# Patient Record
Sex: FEMALE | Race: WHITE | NOT HISPANIC OR LATINO | Employment: FULL TIME | URBAN - METROPOLITAN AREA
[De-identification: names, ages, dates, MRNs, and addresses within clinical notes are randomized per-mention and may not be internally consistent; named-entity substitution may affect disease eponyms.]

---

## 2017-12-05 ENCOUNTER — OFFICE VISIT (OUTPATIENT)
Dept: PRIMARY CARE CLINIC | Facility: CLINIC | Age: 32
End: 2017-12-05
Payer: MEDICAID

## 2017-12-05 VITALS
HEART RATE: 68 BPM | TEMPERATURE: 98 F | WEIGHT: 151.31 LBS | SYSTOLIC BLOOD PRESSURE: 111 MMHG | DIASTOLIC BLOOD PRESSURE: 72 MMHG | OXYGEN SATURATION: 99 % | RESPIRATION RATE: 18 BRPM | HEIGHT: 66 IN | BODY MASS INDEX: 24.32 KG/M2

## 2017-12-05 DIAGNOSIS — Z00.00 ANNUAL PHYSICAL EXAM: Primary | ICD-10-CM

## 2017-12-05 DIAGNOSIS — Z12.4 CERVICAL CANCER SCREENING: ICD-10-CM

## 2017-12-05 DIAGNOSIS — L70.9 ACNE, UNSPECIFIED ACNE TYPE: ICD-10-CM

## 2017-12-05 DIAGNOSIS — F17.210 CIGARETTE NICOTINE DEPENDENCE WITHOUT COMPLICATION: ICD-10-CM

## 2017-12-05 DIAGNOSIS — Z13.220 LIPID SCREENING: ICD-10-CM

## 2017-12-05 DIAGNOSIS — M25.551 RIGHT HIP PAIN: ICD-10-CM

## 2017-12-05 PROCEDURE — 99204 OFFICE O/P NEW MOD 45 MIN: CPT | Mod: PBBFAC,PN | Performed by: FAMILY MEDICINE

## 2017-12-05 PROCEDURE — 99385 PREV VISIT NEW AGE 18-39: CPT | Mod: S$PBB,,, | Performed by: FAMILY MEDICINE

## 2017-12-05 PROCEDURE — 99999 PR PBB SHADOW E&M-NEW PATIENT-LVL IV: CPT | Mod: PBBFAC,,, | Performed by: FAMILY MEDICINE

## 2017-12-05 NOTE — PROGRESS NOTES
"Subjective:       Patient ID: Blanca Flores is a 32 y.o. female.    Chief Complaint: Establish Care (pt is here to est care with a new PCP )    Here to get established with new PCP.  Needs routine checkup, requesting referral to GYN and dermatology.  No concerns or complaints other than right hip pain.  She reports that she has a long history of nonspecific lateral right hip swelling.  Has seen other physicians and a chiropractor in the past, was told there was nothing wrong with her hip or her back.  Last x-ray of her hip was approximately 1 year ago.  No known history of trauma or injury.  She does teach dancing for a living, usually 6 days a week.  The past several months, she has been having increasing pain in her right hip, worse with movement.  Occasionally takes over-the-counter NSAIDs with modest relief.      Review of Systems   Constitutional: Negative for chills, fatigue and fever.   HENT: Negative for congestion.    Eyes: Negative for visual disturbance.   Respiratory: Negative for cough and shortness of breath.    Cardiovascular: Negative for chest pain.   Gastrointestinal: Negative for abdominal pain, nausea and vomiting.   Genitourinary: Negative for difficulty urinating.   Musculoskeletal: Positive for arthralgias.   Skin: Negative for rash.   Allergic/Immunologic: Negative for immunocompromised state.   Neurological: Negative for dizziness.   Psychiatric/Behavioral: Negative for sleep disturbance.       Objective:      Vitals:    12/05/17 1002   BP: 111/72   BP Location: Right arm   Patient Position: Sitting   BP Method: Medium (Automatic)   Pulse: 68   Resp: 18   Temp: 98.1 °F (36.7 °C)   TempSrc: Oral   SpO2: 99%   Weight: 68.6 kg (151 lb 4.8 oz)   Height: 5' 6" (1.676 m)     Physical Exam   Constitutional: She is oriented to person, place, and time. She appears well-developed and well-nourished.   HENT:   Head: Normocephalic and atraumatic.   Eyes: EOM are normal. Pupils are equal, round, and " reactive to light.   Neck: Neck supple. No JVD present. Carotid bruit is not present.   Cardiovascular: Normal rate, regular rhythm and normal heart sounds.    Pulses:       Radial pulses are 2+ on the right side, and 2+ on the left side.   Pulmonary/Chest: Effort normal and breath sounds normal.   Abdominal: Soft. Bowel sounds are normal. She exhibits no distension. There is no tenderness.   Musculoskeletal: She exhibits no edema.        Right hip: She exhibits normal range of motion, normal strength, no tenderness, no bony tenderness, no swelling and no crepitus.        Thoracic back: She exhibits normal range of motion and no deformity.        Lumbar back: She exhibits normal range of motion and no deformity.   Mild soft tissue swelling noted to right hip laterally   Neurological: She is alert and oriented to person, place, and time.   Skin: Skin is warm and dry.   Psychiatric: She has a normal mood and affect. Her behavior is normal.   Nursing note and vitals reviewed.      Assessment:       1. Annual physical exam    2. Cigarette nicotine dependence without complication    3. Lipid screening    4. Right hip pain    5. Acne, unspecified acne type    6. Cervical cancer screening        Plan:       Annual physical exam  -     CBC auto differential; Future; Expected date: 12/05/2017  -     Comprehensive metabolic panel; Future; Expected date: 12/05/2017  -     Lipid panel; Future; Expected date: 12/05/2017  -     TSH; Future; Expected date: 12/05/2017    Cigarette nicotine dependence without complication  Comments:  encouraged to quit, pt precontemplative    Lipid screening  -     Lipid panel; Future; Expected date: 12/05/2017    Right hip pain  Comments:  Apply ice 3 times a day, NSAIDs as needed  Orders:  -     X-Ray Hip 2 or 3 views Right; Future; Expected date: 12/06/2017    Acne, unspecified acne type  -     Ambulatory referral to Dermatology    Cervical cancer screening  -     Ambulatory referral to  Obstetrics / Gynecology      Medication List with Changes/Refills   Discontinued Medications    IBUPROFEN (ADVIL,MOTRIN) 400 MG TABLET    Take 400 mg by mouth every 6 (six) hours as needed.

## 2017-12-06 ENCOUNTER — CLINICAL SUPPORT (OUTPATIENT)
Dept: PRIMARY CARE CLINIC | Facility: CLINIC | Age: 32
End: 2017-12-06
Payer: MEDICAID

## 2017-12-06 DIAGNOSIS — Z13.220 LIPID SCREENING: ICD-10-CM

## 2017-12-06 DIAGNOSIS — Z00.00 ANNUAL PHYSICAL EXAM: ICD-10-CM

## 2017-12-06 LAB
ALBUMIN SERPL BCP-MCNC: 3.6 G/DL
ALP SERPL-CCNC: 75 U/L
ALT SERPL W/O P-5'-P-CCNC: 11 U/L
ANION GAP SERPL CALC-SCNC: 5 MMOL/L
AST SERPL-CCNC: 14 U/L
BASOPHILS # BLD AUTO: 0.05 K/UL
BASOPHILS NFR BLD: 0.6 %
BILIRUB SERPL-MCNC: 0.4 MG/DL
BUN SERPL-MCNC: 9 MG/DL
CALCIUM SERPL-MCNC: 9.3 MG/DL
CHLORIDE SERPL-SCNC: 109 MMOL/L
CHOLEST SERPL-MCNC: 150 MG/DL
CHOLEST/HDLC SERPL: 4.7 {RATIO}
CO2 SERPL-SCNC: 28 MMOL/L
CREAT SERPL-MCNC: 0.7 MG/DL
DIFFERENTIAL METHOD: ABNORMAL
EOSINOPHIL # BLD AUTO: 0.5 K/UL
EOSINOPHIL NFR BLD: 5.9 %
ERYTHROCYTE [DISTWIDTH] IN BLOOD BY AUTOMATED COUNT: 13.2 %
EST. GFR  (AFRICAN AMERICAN): >60 ML/MIN/1.73 M^2
EST. GFR  (NON AFRICAN AMERICAN): >60 ML/MIN/1.73 M^2
GLUCOSE SERPL-MCNC: 98 MG/DL
HCT VFR BLD AUTO: 44.4 %
HDLC SERPL-MCNC: 32 MG/DL
HDLC SERPL: 21.3 %
HGB BLD-MCNC: 14.6 G/DL
IMM GRANULOCYTES # BLD AUTO: 0.03 K/UL
IMM GRANULOCYTES NFR BLD AUTO: 0.4 %
LDLC SERPL CALC-MCNC: 103.8 MG/DL
LYMPHOCYTES # BLD AUTO: 2.1 K/UL
LYMPHOCYTES NFR BLD: 25.3 %
MCH RBC QN AUTO: 31.4 PG
MCHC RBC AUTO-ENTMCNC: 32.9 G/DL
MCV RBC AUTO: 96 FL
MONOCYTES # BLD AUTO: 0.4 K/UL
MONOCYTES NFR BLD: 4.6 %
NEUTROPHILS # BLD AUTO: 5.3 K/UL
NEUTROPHILS NFR BLD: 63.2 %
NONHDLC SERPL-MCNC: 118 MG/DL
NRBC BLD-RTO: 0 /100 WBC
PLATELET # BLD AUTO: 234 K/UL
PMV BLD AUTO: 10.9 FL
POTASSIUM SERPL-SCNC: 4.9 MMOL/L
PROT SERPL-MCNC: 7.1 G/DL
RBC # BLD AUTO: 4.65 M/UL
SODIUM SERPL-SCNC: 142 MMOL/L
TRIGL SERPL-MCNC: 71 MG/DL
TSH SERPL DL<=0.005 MIU/L-ACNC: 1.06 UIU/ML
WBC # BLD AUTO: 8.31 K/UL

## 2017-12-06 PROCEDURE — 80053 COMPREHEN METABOLIC PANEL: CPT

## 2017-12-06 PROCEDURE — 84443 ASSAY THYROID STIM HORMONE: CPT

## 2017-12-06 PROCEDURE — 99212 OFFICE O/P EST SF 10 MIN: CPT | Mod: PBBFAC,PN

## 2017-12-06 PROCEDURE — 85025 COMPLETE CBC W/AUTO DIFF WBC: CPT

## 2017-12-06 PROCEDURE — 80061 LIPID PANEL: CPT

## 2017-12-06 PROCEDURE — 99999 PR PBB SHADOW E&M-EST. PATIENT-LVL II: CPT | Mod: PBBFAC,,,

## 2017-12-12 ENCOUNTER — OFFICE VISIT (OUTPATIENT)
Dept: OBSTETRICS AND GYNECOLOGY | Facility: CLINIC | Age: 32
End: 2017-12-12
Payer: MEDICAID

## 2017-12-12 VITALS
SYSTOLIC BLOOD PRESSURE: 100 MMHG | HEIGHT: 66 IN | WEIGHT: 151.25 LBS | BODY MASS INDEX: 24.31 KG/M2 | DIASTOLIC BLOOD PRESSURE: 60 MMHG

## 2017-12-12 DIAGNOSIS — Z30.09 ENCOUNTER FOR OTHER GENERAL COUNSELING OR ADVICE ON CONTRACEPTION: ICD-10-CM

## 2017-12-12 DIAGNOSIS — Z01.419 ENCOUNTER FOR GYNECOLOGICAL EXAMINATION (GENERAL) (ROUTINE) WITHOUT ABNORMAL FINDINGS: Primary | ICD-10-CM

## 2017-12-12 PROCEDURE — 99999 PR PBB SHADOW E&M-EST. PATIENT-LVL III: CPT | Mod: PBBFAC,,, | Performed by: OBSTETRICS & GYNECOLOGY

## 2017-12-12 PROCEDURE — 99385 PREV VISIT NEW AGE 18-39: CPT | Mod: S$PBB,,, | Performed by: OBSTETRICS & GYNECOLOGY

## 2017-12-12 PROCEDURE — 88175 CYTOPATH C/V AUTO FLUID REDO: CPT

## 2017-12-12 PROCEDURE — 87624 HPV HI-RISK TYP POOLED RSLT: CPT

## 2017-12-12 PROCEDURE — 99213 OFFICE O/P EST LOW 20 MIN: CPT | Mod: PBBFAC,PN | Performed by: OBSTETRICS & GYNECOLOGY

## 2017-12-12 NOTE — PROGRESS NOTES
Chief Complaint   Patient presents with    Well Woman    Contraception     want new iud       HPI:  32 y.o. female  presents as a new patient for a well woman exam    No LMP recorded. Patient is not currently having periods (Reason: Other).    - History of abnormal paps: History of LEEP with normal paps since  - Abnormal bleeding: DENIES  - Family history of breast or ovarian cancer: PATERNAL AUNT WITH BREAST CANCER, MATERNAL AUNT WITH CERVICAL CANCER  - Any breast masses, pain, skin changes, or nipple discharge: DENIES  - Possible recent STD exposure: DENIES    - Patient's Dermatologist recommended spironolactone for acne control  - Patient wishes to discuss this or alternative medications for acne control    Contraception: MIRENA IUD,   Pap: No result found, 2-3 YEARS AGO    Past Medical History:   Diagnosis Date    Anxiety     Cervical dysplasia     s/p LEEP    Depression     Ovarian cyst      Past Surgical History:   Procedure Laterality Date    APPENDECTOMY      BREAST SURGERY      CERVICAL BIOPSY  W/ LOOP ELECTRODE EXCISION      OVARY SURGERY      SHOULDER SURGERY         Social History   Substance Use Topics    Smoking status: Current Every Day Smoker     Packs/day: 1.50     Types: Cigarettes    Smokeless tobacco: Never Used    Alcohol use Yes      Comment: social      Family History   Problem Relation Age of Onset    Lupus      Arthritis Mother     Diabetes Mother     Rheum arthritis Mother     Hyperlipidemia Father     Diabetes Father     Hypertension Father     Breast cancer Paternal Aunt      OB History    Para Term  AB Living   2 2 2     2   SAB TAB Ectopic Multiple Live Births           2      # Outcome Date GA Lbr Samuel/2nd Weight Sex Delivery Anes PTL Lv   2 Term      Vag-Spont   CAYETANO   1 Term      Vag-Spont   CAYETANO          MEDICATIONS: Reviewed with patient.  ALLERGIES: Patient has no known allergies.     ROS:  Review of Systems   Constitutional: Negative  "for fever.   Respiratory: Negative for shortness of breath.    Cardiovascular: Negative for chest pain.   Gastrointestinal: Negative for abdominal pain, nausea and vomiting.   Endocrine: Negative for hot flashes.   Genitourinary: Negative for dysuria, menstrual problem, pelvic pain and vaginal discharge.   Skin:  Positive for acne.   Neurological: Negative for headaches.   Hematological: Does not bruise/bleed easily.   Psychiatric/Behavioral: Negative for depression.   Breast: Negative for breast mass, breast pain, nipple discharge and skin changes      PHYSICAL EXAM:    /60   Ht 5' 6" (1.676 m)   Wt 68.6 kg (151 lb 3.8 oz)   BMI 24.41 kg/m²     Physical Exam:   Constitutional: She is oriented to person, place, and time. She appears well-developed.   No fever    HENT:   Head: Normocephalic.     Neck: No thyromegaly present.    Cardiovascular: Normal rate.     Pulmonary/Chest: Effort normal. Right breast exhibits no mass, no nipple discharge, no skin change, no tenderness and no swelling. Left breast exhibits no mass, no nipple discharge, no skin change, no tenderness and no swelling. Breasts are symmetrical.        Abdominal: She exhibits no mass. There is no hepatosplenomegaly. There is no tenderness.     Genitourinary: Vagina normal. Uterus is not enlarged and not tender. Cervix is normal. Right adnexum displays no tenderness and no fullness. Left adnexum displays no tenderness and no fullness. No vaginal discharge found. Also,  no visible IUD strings    Genitourinary Comments: External genitalia: Normal  Urethra: No tenderness; normal meatus  Bladder: No tenderness              Lymphadenopathy:     She has no cervical adenopathy.    Neurological: She is alert and oriented to person, place, and time.     Psychiatric: She has a normal mood and affect.         ASSESSMENT & PLAN:   Encounter for gynecological examination (general) (routine) without abnormal findings  -     Liquid-based pap smear, " screening  -     HPV High Risk Genotypes, PCR    Encounter for other general counseling or advice on contraception        - Breast and pelvic exam: NORMAL  - Patient was counseled on ASCCP guidelines for cervical cytology screening  - Cervical screening: CO-TESTING PERFORMED TODAY  - STD testing: DECLINES TESTING  - Contraception: IUD; PATIENT IS DUE FOR EXCHANGE; PATIENT TO CALL CLINIC NEXT MONTH TO SIGN PAPERWORK FOR PRIOR AUTHORIZATION FOR NEW MIRENA    - Counseled patient that spironolactone was often used for management of hormone associated symptoms such as acne  - Counseled patient that some OCPs have been shown to help with acne but given her age and smoking that they may have an increased risk of thrombo-embolic events  - Recommended against starting OCPs at this time and to follow-up with her Dermatologist regarding the spironolactone

## 2017-12-12 NOTE — LETTER
December 12, 2017      David Pereira MD  [address]  [address]  [place] [address]           [facility]  [address], [facility] [address]  [place] [address]  Phone: [phone]  Fax: [phone]          Patient: Blanca Flores   MR Number: [id number]   YOB: 1985   Date of Visit: 12/12/2017       Dear Dr. David Pereira:    Thank you for referring Blanca Flores to me for evaluation. Attached you will find relevant portions of my assessment and plan of care.    If you have questions, please do not hesitate to call me. I look forward to following Blanca Flores along with you.    Sincerely,    FERMIN Fox MD    Enclosure  CC:  No Recipients    If you would like to receive this communication electronically, please contact [email or web address] or [phone] to request more information on [employer] access.    For providers and/or their staff who would like to refer a patient to [facility], please contact us through our one-stop-shop provider referral line, [facility], at [phone].    If you feel you have received this communication in error or would no longer like to receive these types of communications, please e-mail [email or web address]

## 2017-12-15 LAB
HPV16 AG SPEC QL: NEGATIVE
HPV16+18+H RISK 12 DNA CVX-IMP: NEGATIVE
HPV18 DNA SPEC QL NAA+PROBE: NEGATIVE

## 2018-08-22 ENCOUNTER — OFFICE VISIT (OUTPATIENT)
Dept: PRIMARY CARE CLINIC | Facility: CLINIC | Age: 33
End: 2018-08-22
Payer: MEDICAID

## 2018-08-22 VITALS
HEART RATE: 58 BPM | BODY MASS INDEX: 23.63 KG/M2 | DIASTOLIC BLOOD PRESSURE: 65 MMHG | WEIGHT: 147 LBS | SYSTOLIC BLOOD PRESSURE: 102 MMHG | HEIGHT: 66 IN | TEMPERATURE: 98 F | RESPIRATION RATE: 16 BRPM | OXYGEN SATURATION: 100 %

## 2018-08-22 DIAGNOSIS — F41.0 GENERALIZED ANXIETY DISORDER WITH PANIC ATTACKS: Primary | ICD-10-CM

## 2018-08-22 DIAGNOSIS — F41.1 GENERALIZED ANXIETY DISORDER WITH PANIC ATTACKS: Primary | ICD-10-CM

## 2018-08-22 PROCEDURE — 99213 OFFICE O/P EST LOW 20 MIN: CPT | Mod: PBBFAC,PN | Performed by: FAMILY MEDICINE

## 2018-08-22 PROCEDURE — 99999 PR PBB SHADOW E&M-EST. PATIENT-LVL III: CPT | Mod: PBBFAC,,, | Performed by: FAMILY MEDICINE

## 2018-08-22 PROCEDURE — 99213 OFFICE O/P EST LOW 20 MIN: CPT | Mod: S$PBB,,, | Performed by: FAMILY MEDICINE

## 2018-08-22 RX ORDER — HYDROXYZINE PAMOATE 25 MG/1
25 CAPSULE ORAL EVERY 6 HOURS PRN
Qty: 30 CAPSULE | Refills: 5 | Status: SHIPPED | OUTPATIENT
Start: 2018-08-22 | End: 2019-03-06

## 2018-08-22 RX ORDER — SERTRALINE HYDROCHLORIDE 100 MG/1
100 TABLET, FILM COATED ORAL DAILY
Qty: 30 TABLET | Refills: 5 | Status: SHIPPED | OUTPATIENT
Start: 2018-08-22 | End: 2018-10-03

## 2018-08-22 NOTE — PROGRESS NOTES
"Subjective:       Patient ID: Blanca Flores is a 33 y.o. female.    Chief Complaint: Panic Attack (patient says she was seen in the ER x2 for panic attacks and was told to follow up with PCP )    Anxiety   Presents for follow-up visit. Symptoms include chest pain, depressed mood, feeling of choking, hyperventilation, insomnia (improving), nervous/anxious behavior, palpitations, panic and shortness of breath. Patient reports no suicidal ideas. The severity of symptoms is severe. The quality of sleep is fair.     Compliance with medications is %.   Has been to ER twice in the past few weeks with panic attacks. 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 Taking Zoloft consistently, got Xanax from ER, which helped, but ran out. Went back to ER, got Vistaril prn, which also is helping.  Review of Systems   Constitutional: Negative for unexpected weight change.   Respiratory: Positive for shortness of breath.    Cardiovascular: Positive for chest pain and palpitations.   Psychiatric/Behavioral: Positive for dysphoric mood. Negative for suicidal ideas. The patient is nervous/anxious and has insomnia (improving).        Objective:      Vitals:    08/22/18 1228   BP: 102/65   BP Location: Left arm   Patient Position: Sitting   BP Method: Medium (Automatic)   Pulse: (!) 58   Resp: 16   Temp: 98.2 °F (36.8 °C)   TempSrc: Oral   SpO2: 100%   Weight: 66.7 kg (147 lb)   Height: 5' 6" (1.676 m)     Physical Exam   Constitutional: She is oriented to person, place, and time. She appears well-developed and well-nourished.   HENT:   Head: Normocephalic and atraumatic.   Neck: Neck supple. No JVD present.   Cardiovascular: Normal rate, regular rhythm and normal heart sounds.   Pulmonary/Chest: Effort normal and breath sounds normal.   Musculoskeletal: She exhibits no edema.   Neurological: She is alert and oriented to person, place, and time.   Skin: Skin is warm and dry.   Psychiatric: She has a normal mood and " affect. Her behavior is normal.   Nursing note and vitals reviewed.      Assessment:       1. Generalized anxiety disorder with panic attacks        Plan:       Generalized anxiety disorder with panic attacks  -     sertraline (ZOLOFT) 100 MG tablet; Take 1 tablet (100 mg total) by mouth once daily.  Dispense: 30 tablet; Refill: 5  -     hydrOXYzine pamoate (VISTARIL) 25 MG Cap; Take 1 capsule (25 mg total) by mouth every 6 (six) hours as needed (anxiety).  Dispense: 30 capsule; Refill: 5      Medication List with Changes/Refills   Changed and/or Refilled Medications    Modified Medication Previous Medication    HYDROXYZINE PAMOATE (VISTARIL) 25 MG CAP hydrOXYzine pamoate (VISTARIL) 25 MG Cap       Take 1 capsule (25 mg total) by mouth every 6 (six) hours as needed (anxiety).    Take 1 capsule (25 mg total) by mouth 4 (four) times daily.    SERTRALINE (ZOLOFT) 100 MG TABLET sertraline (ZOLOFT) 50 MG tablet       Take 1 tablet (100 mg total) by mouth once daily.    Take 1 tablet (50 mg total) by mouth once daily.   Discontinued Medications    ALPRAZOLAM (XANAX) 0.25 MG TABLET    Take 1 tablet (0.25 mg total) by mouth 3 (three) times daily as needed for Anxiety.

## 2018-10-03 ENCOUNTER — OFFICE VISIT (OUTPATIENT)
Dept: PRIMARY CARE CLINIC | Facility: CLINIC | Age: 33
End: 2018-10-03
Payer: MEDICAID

## 2018-10-03 VITALS
WEIGHT: 149 LBS | RESPIRATION RATE: 16 BRPM | DIASTOLIC BLOOD PRESSURE: 62 MMHG | HEIGHT: 66 IN | SYSTOLIC BLOOD PRESSURE: 97 MMHG | OXYGEN SATURATION: 98 % | HEART RATE: 83 BPM | TEMPERATURE: 98 F | BODY MASS INDEX: 23.95 KG/M2

## 2018-10-03 DIAGNOSIS — F41.0 GENERALIZED ANXIETY DISORDER WITH PANIC ATTACKS: Primary | ICD-10-CM

## 2018-10-03 DIAGNOSIS — F41.1 GENERALIZED ANXIETY DISORDER WITH PANIC ATTACKS: Primary | ICD-10-CM

## 2018-10-03 DIAGNOSIS — G44.219 EPISODIC TENSION-TYPE HEADACHE, NOT INTRACTABLE: ICD-10-CM

## 2018-10-03 PROCEDURE — 99999 PR PBB SHADOW E&M-EST. PATIENT-LVL III: CPT | Mod: PBBFAC,,, | Performed by: FAMILY MEDICINE

## 2018-10-03 PROCEDURE — 99214 OFFICE O/P EST MOD 30 MIN: CPT | Mod: S$PBB,,, | Performed by: FAMILY MEDICINE

## 2018-10-03 PROCEDURE — 99213 OFFICE O/P EST LOW 20 MIN: CPT | Mod: PBBFAC,PN | Performed by: FAMILY MEDICINE

## 2018-10-03 RX ORDER — ESCITALOPRAM OXALATE 10 MG/1
10 TABLET ORAL DAILY
Qty: 30 TABLET | Refills: 5 | Status: SHIPPED | OUTPATIENT
Start: 2018-10-03 | End: 2018-12-24

## 2018-10-03 NOTE — PROGRESS NOTES
"Subjective:       Patient ID: Blanca Flores is a 33 y.o. female.    Chief Complaint: Depression (patient says she feels better mentally but the headaches are too bad since increasing the zoloft )    Here for f/u since increasing Zoloft from 50 to 100mg ~6 weeks ago. Feeling much better from a mental health standpoint, but has started having daily frontal headaches since increasing dose. Pain goes away with Tylenol or OTC headache meds. Past few days cut back to 50mg of Zoloft      Review of Systems   Constitutional: Negative for fever.   HENT: Negative for trouble swallowing.    Eyes: Negative for visual disturbance.   Respiratory: Negative for shortness of breath.    Cardiovascular: Positive for chest pain.   Gastrointestinal: Negative for vomiting.   Genitourinary: Negative for difficulty urinating.   Neurological: Positive for headaches. Negative for weakness.   Psychiatric/Behavioral: Positive for sleep disturbance. Negative for dysphoric mood, self-injury and suicidal ideas.       Objective:      Vitals:    10/03/18 1040   BP: 97/62   BP Location: Left arm   Patient Position: Sitting   BP Method: Medium (Automatic)   Pulse: 83   Resp: 16   Temp: 97.9 °F (36.6 °C)   TempSrc: Oral   SpO2: 98%   Weight: 67.6 kg (149 lb)   Height: 5' 6" (1.676 m)     Physical Exam   Constitutional: She is oriented to person, place, and time. She appears well-developed and well-nourished.   HENT:   Head: Normocephalic and atraumatic.   Eyes: EOM are normal.   Neck: No JVD present.   Cardiovascular: Normal rate, regular rhythm and normal heart sounds.   Pulmonary/Chest: Effort normal and breath sounds normal.   Musculoskeletal: She exhibits no edema.   Neurological: She is alert and oriented to person, place, and time.   Skin: Skin is warm and dry.   Psychiatric: She has a normal mood and affect. Her behavior is normal. Judgment and thought content normal.   Nursing note and vitals reviewed.      Assessment:       1. Generalized " anxiety disorder with panic attacks    2. Episodic tension-type headache, not intractable        Plan:       Generalized anxiety disorder with panic attacks  -     escitalopram oxalate (LEXAPRO) 10 MG tablet; Take 1 tablet (10 mg total) by mouth once daily.  Dispense: 30 tablet; Refill: 5  Switch to different SSRI  Episodic tension-type headache, not intractable  Likely medication side effect       Medication List           Accurate as of 10/3/18 10:51 AM. If you have any questions, ask your nurse or doctor.               START taking these medications    escitalopram oxalate 10 MG tablet  Commonly known as:  LEXAPRO  Take 1 tablet (10 mg total) by mouth once daily.  Started by:  David Pereira MD        CONTINUE taking these medications    hydrOXYzine pamoate 25 MG Cap  Commonly known as:  VISTARIL  Take 1 capsule (25 mg total) by mouth every 6 (six) hours as needed (anxiety).        STOP taking these medications    sertraline 100 MG tablet  Commonly known as:  ZOLOFT  Stopped by:  David Pereira MD           Where to Get Your Medications      [facility] [place] - [address] AT [facility] [address]  [address], [place] [address]    Phone:  [phone]   · escitalopram oxalate 10 MG tablet

## 2018-10-23 ENCOUNTER — TELEPHONE (OUTPATIENT)
Dept: PRIMARY CARE CLINIC | Facility: CLINIC | Age: 33
End: 2018-10-23

## 2018-10-23 NOTE — TELEPHONE ENCOUNTER
----- Message from Candido Manjarrez sent at 10/23/2018  4:12 PM CDT -----  Contact: patient  Type: Needs Medical Advice    Who Called:  Patient  Symptoms (please be specific):    How long has patient had these symptoms:    Pharmacy name and phone #:  [facility]  Best Call Back Number: [phone]  Additional Information: called to advise that pharmacy stated that she need prior auth for lexapro,requesting a call back

## 2018-12-24 ENCOUNTER — OFFICE VISIT (OUTPATIENT)
Dept: PRIMARY CARE CLINIC | Facility: CLINIC | Age: 33
End: 2018-12-24
Payer: MEDICAID

## 2018-12-24 VITALS
RESPIRATION RATE: 18 BRPM | TEMPERATURE: 98 F | HEART RATE: 88 BPM | DIASTOLIC BLOOD PRESSURE: 70 MMHG | WEIGHT: 159 LBS | BODY MASS INDEX: 24.96 KG/M2 | OXYGEN SATURATION: 100 % | HEIGHT: 67 IN | SYSTOLIC BLOOD PRESSURE: 104 MMHG

## 2018-12-24 DIAGNOSIS — F41.1 GENERALIZED ANXIETY DISORDER WITH PANIC ATTACKS: Primary | ICD-10-CM

## 2018-12-24 DIAGNOSIS — R53.83 FATIGUE, UNSPECIFIED TYPE: ICD-10-CM

## 2018-12-24 DIAGNOSIS — Z23 NEED FOR VACCINATION: ICD-10-CM

## 2018-12-24 DIAGNOSIS — F41.0 GENERALIZED ANXIETY DISORDER WITH PANIC ATTACKS: Primary | ICD-10-CM

## 2018-12-24 DIAGNOSIS — Z13.6 ENCOUNTER FOR SCREENING FOR CARDIOVASCULAR DISORDERS: ICD-10-CM

## 2018-12-24 PROCEDURE — 90686 IIV4 VACC NO PRSV 0.5 ML IM: CPT | Mod: PBBFAC,PN

## 2018-12-24 PROCEDURE — 99214 OFFICE O/P EST MOD 30 MIN: CPT | Mod: S$PBB,,, | Performed by: FAMILY MEDICINE

## 2018-12-24 PROCEDURE — 99999 PR PBB SHADOW E&M-EST. PATIENT-LVL IV: CPT | Mod: PBBFAC,,, | Performed by: FAMILY MEDICINE

## 2018-12-24 PROCEDURE — 99214 OFFICE O/P EST MOD 30 MIN: CPT | Mod: PBBFAC,PN | Performed by: FAMILY MEDICINE

## 2018-12-24 RX ORDER — SERTRALINE HYDROCHLORIDE 25 MG/1
25 TABLET, FILM COATED ORAL DAILY
Qty: 30 TABLET | Refills: 5 | Status: SHIPPED | OUTPATIENT
Start: 2018-12-24 | End: 2019-03-06 | Stop reason: SDUPTHER

## 2018-12-24 NOTE — PROGRESS NOTES
Subjective:       Patient ID: Blanca Flores is a 33 y.o. female.    Chief Complaint: Anxiety (Patient has not had a chance to get the Lexapro yet and wants to talk about going back to Zoloft )    Here for follow-up of anxiety and depression.  Had been doing well on Zoloft earlier this year, but started having more headaches, originally thought due to higher dose of Zoloft when she went up to 100 mg.  Therefore, she was switched to Lexapro, which she never started taking.  Patient says that in retrospect, she really believes her headaches were due to the Vistaril, so she is interested in potentially getting back on Zoloft.  She has not been on any medications in over a month.  Says overall, her anxiety is better than it had been in the past, but she is having increasing moodiness and irritability, and would like to intervene before things get worse.  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  Sleeping and eating well.  Denies any suicidal or homicidal intent.      Review of Systems   Constitutional: Positive for fatigue. Negative for activity change and unexpected weight change.   HENT: Negative for hearing loss, rhinorrhea and trouble swallowing.    Eyes: Negative for discharge and visual disturbance.   Respiratory: Negative for chest tightness and wheezing.    Cardiovascular: Negative for chest pain and palpitations.   Gastrointestinal: Negative for blood in stool, constipation, diarrhea and vomiting.   Endocrine: Negative for polydipsia and polyuria.   Genitourinary: Negative for difficulty urinating, dysuria, hematuria and menstrual problem.   Musculoskeletal: Positive for arthralgias and joint swelling. Negative for neck pain.   Allergic/Immunologic: Negative for immunocompromised state.   Neurological: Positive for headaches. Negative for weakness.   Psychiatric/Behavioral: Positive for dysphoric mood. Negative for confusion, self-injury, sleep disturbance and  "suicidal ideas. The patient is nervous/anxious.        Objective:      Vitals:    12/24/18 0911   BP: 104/70   BP Location: Left arm   Patient Position: Sitting   BP Method: Medium (Automatic)   Pulse: 88   Resp: 18   Temp: 97.9 °F (36.6 °C)   TempSrc: Oral   SpO2: 100%   Weight: 72.1 kg (159 lb)   Height: 5' 7" (1.702 m)     Physical Exam   Constitutional: She is oriented to person, place, and time. She appears well-developed and well-nourished.   HENT:   Head: Normocephalic and atraumatic.   Eyes: EOM are normal.   Neck: No JVD present.   Cardiovascular: Normal rate, regular rhythm and normal heart sounds.   Pulmonary/Chest: Effort normal and breath sounds normal.   Musculoskeletal: She exhibits no edema.   Neurological: She is alert and oriented to person, place, and time.   Skin: Skin is warm and dry.   Psychiatric: She has a normal mood and affect. Her behavior is normal. Judgment and thought content normal.   Nursing note and vitals reviewed.      Assessment:       1. Generalized anxiety disorder with panic attacks    2. Encounter for screening for cardiovascular disorders    3. Fatigue, unspecified type    4. Need for vaccination        Plan:       Generalized anxiety disorder with panic attacks  -     sertraline (ZOLOFT) 25 MG tablet; Take 1 tablet (25 mg total) by mouth once daily.  Dispense: 30 tablet; Refill: 5  -     TSH; Future; Expected date: 12/24/2018  -     T4, free; Future; Expected date: 12/24/2018    Encounter for screening for cardiovascular disorders  -     Lipid panel; Future; Expected date: 12/24/2018    Fatigue, unspecified type  -     CBC auto differential; Future; Expected date: 12/24/2018  -     Comprehensive metabolic panel; Future; Expected date: 12/24/2018  -     TSH; Future; Expected date: 12/24/2018  -     T4, free; Future; Expected date: 12/24/2018    Need for vaccination  -     Influenza - Quadrivalent (3 years & older) (PF)         Medication List           Accurate as of 12/24/18 "  9:39 AM. If you have any questions, ask your nurse or doctor.               START taking these medications    sertraline 25 MG tablet  Commonly known as:  ZOLOFT  Take 1 tablet (25 mg total) by mouth once daily.  Started by:  David Pereira MD        CONTINUE taking these medications    hydrOXYzine pamoate 25 MG Cap  Commonly known as:  VISTARIL  Take 1 capsule (25 mg total) by mouth every 6 (six) hours as needed (anxiety).        STOP taking these medications    escitalopram oxalate 10 MG tablet  Commonly known as:  LEXAPRO  Stopped by:  David Pereira MD           Where to Get Your Medications      These medications were sent to [id number] [place] - [address] AT [facility] [address]  [address], [place] [address]    Phone:  [phone]   · sertraline 25 MG tablet

## 2018-12-24 NOTE — PROGRESS NOTES
Pt identified by name and date of birth, vaccine administered by aseptic technique, tolerated well by pt

## 2019-03-06 ENCOUNTER — OFFICE VISIT (OUTPATIENT)
Dept: PRIMARY CARE CLINIC | Facility: CLINIC | Age: 34
End: 2019-03-06
Payer: MEDICAID

## 2019-03-06 VITALS
BODY MASS INDEX: 24.01 KG/M2 | HEART RATE: 90 BPM | SYSTOLIC BLOOD PRESSURE: 102 MMHG | RESPIRATION RATE: 18 BRPM | HEIGHT: 67 IN | OXYGEN SATURATION: 100 % | WEIGHT: 153 LBS | TEMPERATURE: 98 F | DIASTOLIC BLOOD PRESSURE: 68 MMHG

## 2019-03-06 DIAGNOSIS — S63.694A OTHER SPRAIN OF RIGHT RING FINGER, INITIAL ENCOUNTER: ICD-10-CM

## 2019-03-06 DIAGNOSIS — F41.0 GENERALIZED ANXIETY DISORDER WITH PANIC ATTACKS: Primary | ICD-10-CM

## 2019-03-06 DIAGNOSIS — F41.1 GENERALIZED ANXIETY DISORDER WITH PANIC ATTACKS: Primary | ICD-10-CM

## 2019-03-06 PROCEDURE — 99214 PR OFFICE/OUTPT VISIT, EST, LEVL IV, 30-39 MIN: ICD-10-PCS | Mod: S$PBB,,, | Performed by: FAMILY MEDICINE

## 2019-03-06 PROCEDURE — 99999 PR PBB SHADOW E&M-EST. PATIENT-LVL III: CPT | Mod: PBBFAC,,, | Performed by: FAMILY MEDICINE

## 2019-03-06 PROCEDURE — 99213 OFFICE O/P EST LOW 20 MIN: CPT | Mod: PBBFAC,PN | Performed by: FAMILY MEDICINE

## 2019-03-06 PROCEDURE — 99214 OFFICE O/P EST MOD 30 MIN: CPT | Mod: S$PBB,,, | Performed by: FAMILY MEDICINE

## 2019-03-06 PROCEDURE — 99999 PR PBB SHADOW E&M-EST. PATIENT-LVL III: ICD-10-PCS | Mod: PBBFAC,,, | Performed by: FAMILY MEDICINE

## 2019-03-06 RX ORDER — SERTRALINE HYDROCHLORIDE 50 MG/1
50 TABLET, FILM COATED ORAL DAILY
Qty: 30 TABLET | Refills: 5 | Status: SHIPPED | OUTPATIENT
Start: 2019-03-06 | End: 2019-07-08

## 2019-03-06 NOTE — PROGRESS NOTES
"Subjective:       Patient ID: Blanca Flores is a 33 y.o. female.    Chief Complaint: Hand Pain (right ring finger pain x 1.5 months ) and Anxiety    Anxiety - better overall since starting back on Zoloft in December, but still having breakthrough anxiety. No adverse SE from Zoloft. 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 Still intermittently swelling, painful, red. Appears to have full RoM. Feels better when she splints finger      Review of Systems   Constitutional: Negative for fever and unexpected weight change.   Respiratory: Negative for shortness of breath.    Cardiovascular: Negative for chest pain.   Gastrointestinal: Negative for nausea and vomiting.   Genitourinary: Negative for difficulty urinating.   Musculoskeletal: Positive for arthralgias and joint swelling.   Skin: Negative for wound.   Neurological: Negative for seizures.   Psychiatric/Behavioral: Negative for sleep disturbance. The patient is nervous/anxious.        Objective:      Vitals:    03/06/19 1027   BP: 102/68   BP Location: Left arm   Patient Position: Sitting   BP Method: Medium (Automatic)   Pulse: 90   Resp: 18   Temp: 98.2 °F (36.8 °C)   TempSrc: Oral   SpO2: 100%   Weight: 69.4 kg (153 lb)   Height: 5' 7" (1.702 m)     Physical Exam   Constitutional: She is oriented to person, place, and time. She appears well-developed and well-nourished.   HENT:   Head: Normocephalic and atraumatic.   Cardiovascular: Normal rate, regular rhythm and normal heart sounds.   Pulmonary/Chest: Effort normal and breath sounds normal.   Musculoskeletal: She exhibits no edema.        Hands:  Minimal swelling and tenderness to right 4th PIP joint, no deformity. Full RoM, neurovascularly intact   Neurological: She is alert and oriented to person, place, and time.   Skin: Skin is warm and dry.   Psychiatric: She has a normal mood and affect. Her behavior is normal. Thought " content normal.   Nursing note and vitals reviewed.      Assessment:       1. Generalized anxiety disorder with panic attacks    2. Other sprain of right ring finger, initial encounter        Plan:       Generalized anxiety disorder with panic attacks  -     sertraline (ZOLOFT) 50 MG tablet; Take 1 tablet (50 mg total) by mouth once daily.  Dispense: 30 tablet; Refill: 5  Improving, but still suboptimally controlled  Other sprain of right ring finger, initial encounter  -     X-Ray Finger 2 or More Views Right; Future; Expected date: 03/06/2019      Medication List with Changes/Refills   Changed and/or Refilled Medications    Modified Medication Previous Medication    SERTRALINE (ZOLOFT) 50 MG TABLET sertraline (ZOLOFT) 25 MG tablet       Take 1 tablet (50 mg total) by mouth once daily.    Take 1 tablet (25 mg total) by mouth once daily.   Discontinued Medications    HYDROXYZINE PAMOATE (VISTARIL) 25 MG CAP    Take 1 capsule (25 mg total) by mouth every 6 (six) hours as needed (anxiety).

## 2019-06-14 ENCOUNTER — OFFICE VISIT (OUTPATIENT)
Dept: OBSTETRICS AND GYNECOLOGY | Facility: CLINIC | Age: 34
End: 2019-06-14
Payer: MEDICAID

## 2019-06-14 VITALS
BODY MASS INDEX: 23.32 KG/M2 | HEIGHT: 67 IN | DIASTOLIC BLOOD PRESSURE: 80 MMHG | SYSTOLIC BLOOD PRESSURE: 104 MMHG | WEIGHT: 148.56 LBS

## 2019-06-14 DIAGNOSIS — Z11.3 SCREEN FOR STD (SEXUALLY TRANSMITTED DISEASE): ICD-10-CM

## 2019-06-14 DIAGNOSIS — Z01.419 ENCOUNTER FOR WELL WOMAN EXAM WITH ROUTINE GYNECOLOGICAL EXAM: Primary | ICD-10-CM

## 2019-06-14 DIAGNOSIS — Z30.018 ENCOUNTER FOR INITIAL PRESCRIPTION OF OTHER CONTRACEPTIVES: ICD-10-CM

## 2019-06-14 PROCEDURE — 87510 GARDNER VAG DNA DIR PROBE: CPT

## 2019-06-14 PROCEDURE — 99395 PR PREVENTIVE VISIT,EST,18-39: ICD-10-PCS | Mod: S$PBB,,, | Performed by: NURSE PRACTITIONER

## 2019-06-14 PROCEDURE — 99395 PREV VISIT EST AGE 18-39: CPT | Mod: S$PBB,,, | Performed by: NURSE PRACTITIONER

## 2019-06-14 PROCEDURE — 99213 OFFICE O/P EST LOW 20 MIN: CPT | Mod: PBBFAC,PN | Performed by: NURSE PRACTITIONER

## 2019-06-14 PROCEDURE — 99999 PR PBB SHADOW E&M-EST. PATIENT-LVL III: ICD-10-PCS | Mod: PBBFAC,,, | Performed by: NURSE PRACTITIONER

## 2019-06-14 PROCEDURE — 87491 CHLMYD TRACH DNA AMP PROBE: CPT

## 2019-06-14 PROCEDURE — 87480 CANDIDA DNA DIR PROBE: CPT

## 2019-06-14 PROCEDURE — 99999 PR PBB SHADOW E&M-EST. PATIENT-LVL III: CPT | Mod: PBBFAC,,, | Performed by: NURSE PRACTITIONER

## 2019-06-14 NOTE — PROGRESS NOTES
CC: Annual    HPI: Pt is a 34 y.o.  female  who presents for routine annual exam. She uses mirena for contraception. She does want STD screening.  Denies any breast, urinary, GYN, and bowel complaints. The patient does smoke- is not ready to quit. Pt denies any domestic violence.  Last pap smear: 2017- WNL/ HPV negative    FH:  Breast cancer: Paternal aunt  Colon cancer: none  Ovarian cancer: none  Endometrial cancer: none  Cervical cancer: Maternal aunt    ROS:  GENERAL: Feeling well overall. Denies fever or chills.   SKIN: Denies rash or lesions.   HEAD: Denies head injury or headache.   NODES: Denies enlarged lymph nodes.   CHEST: Denies chest pain or shortness of breath.   CARDIOVASCULAR: Denies palpitations or left sided chest pain.   ABDOMEN: No abdominal pain, constipation, diarrhea, nausea, vomiting or rectal bleeding.   URINARY: No dysuria, hematuria, or frequency.  REPRODUCTIVE: See HPI.   BREASTS: Denies pain, lumps, or nipple discharge.   HEMATOLOGIC: No easy bruisability or excessive bleeding.   MUSCULOSKELETAL: Denies joint pain or swelling.   NEUROLOGIC: Denies syncope or weakness.   PSYCHIATRIC: Denies depression, anxiety or mood swings.    PE:   APPEARANCE: Well nourished, well developed, White female in no acute distress.  NODES: no cervical, supraclavicular, or inguinal lymphadenopathy  BREASTS: Symmetrical, no skin changes or visible lesions. No palpable masses, nipple discharge or adenopathy bilaterally.  ABDOMEN: Soft. No tenderness or masses. No distention. No hernias palpated. No CVA tenderness.  VULVA: No lesions. Normal external female genitalia.  URETHRAL MEATUS: Normal size and location, no lesions, no prolapse.  URETHRA: No masses, tenderness, or prolapse.  VAGINA: Moist. No lesions or lacerations noted. + large amount of watery, thin discharge present. No odor present.   CERVIX: No lesions or discharge. No cervical motion tenderness.   UTERUS: Normal size, regular shape, mobile,  non-tender.  ADNEXA: No tenderness. No fullness or masses palpated in the adnexal regions.   ANUS PERINEUM: Normal.      Diagnosis:  1. Encounter for well woman exam with routine gynecological exam    2. Screen for STD (sexually transmitted disease)    3. Encounter for initial prescription of other contraceptives        Plan:     Orders Placed This Encounter    C. trachomatis/N. gonorrhoeae by AMP DNA    Vaginosis Screen by DNA Probe    HIV 1/2 Ag/Ab (4th Gen)    Hepatitis panel, acute    RPR    levonorgestrel (MIRENA) 20 mcg/24 hours (5 yrs) 52 mg IUD     Plan:  Std screening  mirena ordered    Patient was counseled today on the new ACS guidelines for cervical cytology screening as well as the current recommendations for breast cancer screening. She was counseled to follow up with her PCP for other routine health maintenance. Counseling session lasted approximately 10 minutes, and all her questions were answered.    Follow-up with me in 1 year for routine exam

## 2019-06-15 LAB
C TRACH DNA SPEC QL NAA+PROBE: NOT DETECTED
N GONORRHOEA DNA SPEC QL NAA+PROBE: NOT DETECTED

## 2019-06-17 LAB
BACTERIAL VAGINOSIS DNA: NEGATIVE
CANDIDA GLABRATA DNA: NEGATIVE
CANDIDA KRUSEI DNA: NEGATIVE
CANDIDA RRNA VAG QL PROBE: POSITIVE
T VAGINALIS RRNA GENITAL QL PROBE: NEGATIVE

## 2019-06-18 ENCOUNTER — PATIENT MESSAGE (OUTPATIENT)
Dept: OBSTETRICS AND GYNECOLOGY | Facility: CLINIC | Age: 34
End: 2019-06-18

## 2019-06-18 DIAGNOSIS — B37.31 YEAST VAGINITIS: Primary | ICD-10-CM

## 2019-06-18 RX ORDER — FLUCONAZOLE 150 MG/1
150 TABLET ORAL ONCE
Qty: 1 TABLET | Refills: 1 | Status: SHIPPED | OUTPATIENT
Start: 2019-06-18 | End: 2019-06-18

## 2019-06-21 ENCOUNTER — TELEPHONE (OUTPATIENT)
Dept: PHARMACY | Facility: CLINIC | Age: 34
End: 2019-06-21

## 2019-06-21 NOTE — TELEPHONE ENCOUNTER
Pt notified that she has a $3.00 copay for Mirena on her pharmacy benefit but she may have a copay for her office visit. She would like a follow up call 6/24 for payment. She declined pharmacist consultation. Will confirm delivery with [facility] staff.

## 2019-06-28 ENCOUNTER — TELEPHONE (OUTPATIENT)
Dept: OBSTETRICS AND GYNECOLOGY | Facility: CLINIC | Age: 34
End: 2019-06-28

## 2019-06-28 NOTE — TELEPHONE ENCOUNTER
Mirena received in clinic from [facility].     Pt notified and Insertion appt scheduled. Pt verbalized understanding.

## 2019-07-08 ENCOUNTER — OFFICE VISIT (OUTPATIENT)
Dept: PRIMARY CARE CLINIC | Facility: CLINIC | Age: 34
End: 2019-07-08
Payer: MEDICAID

## 2019-07-08 VITALS
TEMPERATURE: 98 F | HEIGHT: 67 IN | WEIGHT: 148 LBS | RESPIRATION RATE: 18 BRPM | OXYGEN SATURATION: 100 % | SYSTOLIC BLOOD PRESSURE: 105 MMHG | BODY MASS INDEX: 23.23 KG/M2 | HEART RATE: 69 BPM | DIASTOLIC BLOOD PRESSURE: 73 MMHG

## 2019-07-08 DIAGNOSIS — F41.0 GENERALIZED ANXIETY DISORDER WITH PANIC ATTACKS: ICD-10-CM

## 2019-07-08 DIAGNOSIS — M25.472 LEFT ANKLE SWELLING: Primary | ICD-10-CM

## 2019-07-08 DIAGNOSIS — F41.1 GENERALIZED ANXIETY DISORDER WITH PANIC ATTACKS: ICD-10-CM

## 2019-07-08 PROCEDURE — 99214 PR OFFICE/OUTPT VISIT, EST, LEVL IV, 30-39 MIN: ICD-10-PCS | Mod: S$PBB,,, | Performed by: FAMILY MEDICINE

## 2019-07-08 PROCEDURE — 99999 PR PBB SHADOW E&M-EST. PATIENT-LVL III: ICD-10-PCS | Mod: PBBFAC,,, | Performed by: FAMILY MEDICINE

## 2019-07-08 PROCEDURE — 99999 PR PBB SHADOW E&M-EST. PATIENT-LVL III: CPT | Mod: PBBFAC,,, | Performed by: FAMILY MEDICINE

## 2019-07-08 PROCEDURE — 99213 OFFICE O/P EST LOW 20 MIN: CPT | Mod: PBBFAC,PN | Performed by: FAMILY MEDICINE

## 2019-07-08 PROCEDURE — 99214 OFFICE O/P EST MOD 30 MIN: CPT | Mod: S$PBB,,, | Performed by: FAMILY MEDICINE

## 2019-07-08 RX ORDER — SERTRALINE HYDROCHLORIDE 100 MG/1
100 TABLET, FILM COATED ORAL DAILY
Qty: 30 TABLET | Refills: 5 | Status: SHIPPED | OUTPATIENT
Start: 2019-07-08 | End: 2020-01-07 | Stop reason: CLARIF

## 2019-07-08 NOTE — PROGRESS NOTES
"Subjective:       Patient ID: Blanca Flores is a 34 y.o. female.    Chief Complaint: Foot Swelling (says her left foot swells and gets painful on and off )    Left ankle swelling laterally off and on for the past year. 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 Swelling worse at the end of the day, better with elevation. Some pain. No erythema or warmth. No known FHx of gout  Anxiety - worsening, having almost daily "mini panic attacks."  in rehab for opiate abuse (heroin), being released next week    Review of Systems   Constitutional: Negative for activity change and unexpected weight change.   HENT: Negative for hearing loss, rhinorrhea and trouble swallowing.    Eyes: Negative for discharge and visual disturbance.   Respiratory: Negative for chest tightness and wheezing.    Cardiovascular: Positive for palpitations. Negative for chest pain.   Gastrointestinal: Negative for blood in stool, constipation, diarrhea and vomiting.   Endocrine: Negative for polydipsia and polyuria.   Genitourinary: Negative for difficulty urinating, dysuria, hematuria and menstrual problem.   Musculoskeletal: Positive for arthralgias and joint swelling. Negative for neck pain.   Skin: Negative for color change and rash.   Neurological: Negative for weakness and headaches.   Psychiatric/Behavioral: Positive for sleep disturbance. Negative for confusion and dysphoric mood. The patient is nervous/anxious.        Objective:      Vitals:    07/08/19 1038   BP: 105/73   BP Location: Left arm   Patient Position: Sitting   BP Method: Medium (Automatic)   Pulse: 69   Resp: 18   Temp: 98 °F (36.7 °C)   TempSrc: Oral   SpO2: 100%   Weight: 67.1 kg (148 lb)   Height: 5' 7" (1.702 m)     Physical Exam   Constitutional: She is oriented to person, place, and time. She appears well-developed and well-nourished.   HENT:   Head: Normocephalic and atraumatic.   Cardiovascular: Normal rate, " regular rhythm and normal heart sounds.   Pulmonary/Chest: Effort normal and breath sounds normal.   Musculoskeletal: She exhibits no edema.        Left ankle: She exhibits swelling (minimal lateral swelling). She exhibits normal range of motion. No tenderness. Achilles tendon exhibits pain.   Neurological: She is alert and oriented to person, place, and time.   Skin: Skin is warm and dry.   Psychiatric: She has a normal mood and affect. Her behavior is normal. Judgment and thought content normal.   Nursing note and vitals reviewed.      Assessment:       1. Left ankle swelling    2. Generalized anxiety disorder with panic attacks        Plan:       Left ankle swelling  -     Uric acid; Future; Expected date: 07/08/2019  -     X-Ray Ankle Complete 3 View Left; Future; Expected date: 07/08/2019  Use compression sleeve as needed  Generalized anxiety disorder with panic attacks  -     sertraline (ZOLOFT) 100 MG tablet; Take 1 tablet (100 mg total) by mouth once daily.  Dispense: 30 tablet; Refill: 5  Double up on Zoloft    Medication List with Changes/Refills   Current Medications    LEVONORGESTREL (MIRENA) 20 MCG/24 HOURS (5 YRS) 52 MG IUD    1 Intra Uterine Device by Intrauterine route once. RETURN FOR INTRAUTERINE INSERTION IN THE OFFICE for 1 dose   Changed and/or Refilled Medications    Modified Medication Previous Medication    SERTRALINE (ZOLOFT) 100 MG TABLET sertraline (ZOLOFT) 50 MG tablet       Take 1 tablet (100 mg total) by mouth once daily.    Take 1 tablet (50 mg total) by mouth once daily.

## 2019-07-17 ENCOUNTER — PROCEDURE VISIT (OUTPATIENT)
Dept: OBSTETRICS AND GYNECOLOGY | Facility: CLINIC | Age: 34
End: 2019-07-17
Payer: MEDICAID

## 2019-07-17 VITALS
DIASTOLIC BLOOD PRESSURE: 72 MMHG | SYSTOLIC BLOOD PRESSURE: 96 MMHG | BODY MASS INDEX: 22.81 KG/M2 | HEIGHT: 67 IN | WEIGHT: 145.31 LBS

## 2019-07-17 DIAGNOSIS — Z30.430 ENCOUNTER FOR INSERTION OF INTRAUTERINE CONTRACEPTIVE DEVICE (IUD): Primary | ICD-10-CM

## 2019-07-17 LAB
B-HCG UR QL: NEGATIVE
CTP QC/QA: YES

## 2019-07-17 PROCEDURE — 58301 REMOVE INTRAUTERINE DEVICE: CPT | Mod: PBBFAC,PN | Performed by: OBSTETRICS & GYNECOLOGY

## 2019-07-17 PROCEDURE — 58301 REMOVAL OF INTRAUTERINE DEVICE-TODAY: ICD-10-PCS | Mod: S$PBB,,, | Performed by: OBSTETRICS & GYNECOLOGY

## 2019-07-17 PROCEDURE — 81025 URINE PREGNANCY TEST: CPT | Mod: PBBFAC,PN | Performed by: OBSTETRICS & GYNECOLOGY

## 2019-07-17 NOTE — PROCEDURES
Removal of Intrauterine Device-Today  Date/Time: 7/17/2019 9:15 AM  Performed by: Amanuel Arredondo MD  Authorized by: Amanuel Arredondo MD   Preparation: Patient was prepped and draped in the usual sterile fashion.  Local anesthesia used: no    Anesthesia:  Local anesthesia used: no    Sedation:  Patient sedated: no    Patient tolerance: Patient tolerated the procedure well with no immediate complications      Attempt to remove IUD. Strings not visualized at external os. Os dilated with os finder and cytobrush used to tease out strings. Strings still could not be visualized. Will order ultrasound to confirm intra-uterine placement. Will likely need hysteroscopy for removal and reinsertion.

## 2019-07-17 NOTE — PROCEDURES
Procedures     Attempt to remove IUD. Strings not visualized at external os. Os dilated with os finder and cytobrush used to tease out strings. Strings still could not be visualized. Will order ultrasound to confirm intra-uterine placement. Will likely need hysteroscopy for removal and reinsertion.

## 2019-07-26 ENCOUNTER — PATIENT MESSAGE (OUTPATIENT)
Dept: OBSTETRICS AND GYNECOLOGY | Facility: CLINIC | Age: 34
End: 2019-07-26

## 2019-07-30 DIAGNOSIS — Z53.8 ATTEMPTED IUD REMOVAL, UNSUCCESSFUL: Primary | ICD-10-CM

## 2019-07-30 DIAGNOSIS — Z97.5 ATTEMPTED IUD REMOVAL, UNSUCCESSFUL: Primary | ICD-10-CM

## 2019-08-12 ENCOUNTER — PATIENT MESSAGE (OUTPATIENT)
Dept: PRIMARY CARE CLINIC | Facility: CLINIC | Age: 34
End: 2019-08-12

## 2019-08-12 DIAGNOSIS — F17.210 CIGARETTE NICOTINE DEPENDENCE WITHOUT COMPLICATION: Primary | ICD-10-CM

## 2019-08-13 ENCOUNTER — PATIENT MESSAGE (OUTPATIENT)
Dept: PRIMARY CARE CLINIC | Facility: CLINIC | Age: 34
End: 2019-08-13

## 2019-08-13 RX ORDER — BUPROPION HYDROCHLORIDE 150 MG/1
TABLET, EXTENDED RELEASE ORAL
Qty: 177 TABLET | Refills: 0 | Status: SHIPPED | OUTPATIENT
Start: 2019-08-13 | End: 2020-11-23

## 2019-08-16 PROBLEM — Z53.8 ATTEMPTED IUD REMOVAL, UNSUCCESSFUL: Status: ACTIVE | Noted: 2019-08-16

## 2019-08-16 PROBLEM — Z53.8 ATTEMPTED IUD REMOVAL, UNSUCCESSFUL: Status: RESOLVED | Noted: 2019-08-16 | Resolved: 2019-08-16

## 2019-08-16 PROBLEM — Z97.5 IUD (INTRAUTERINE DEVICE) IN PLACE: Status: ACTIVE | Noted: 2019-08-16

## 2019-08-16 PROBLEM — Z97.5 ATTEMPTED IUD REMOVAL, UNSUCCESSFUL: Status: ACTIVE | Noted: 2019-08-16

## 2019-08-16 PROBLEM — Z98.890 STATUS POST HYSTEROSCOPY: Status: ACTIVE | Noted: 2019-08-16

## 2019-08-16 PROBLEM — Z97.5 ATTEMPTED IUD REMOVAL, UNSUCCESSFUL: Status: RESOLVED | Noted: 2019-08-16 | Resolved: 2019-08-16

## 2019-08-16 PROBLEM — Z30.432 ENCOUNTER FOR IUD REMOVAL: Status: ACTIVE | Noted: 2019-08-16

## 2019-08-21 ENCOUNTER — PATIENT MESSAGE (OUTPATIENT)
Dept: OBSTETRICS AND GYNECOLOGY | Facility: CLINIC | Age: 34
End: 2019-08-21

## 2019-09-13 ENCOUNTER — OFFICE VISIT (OUTPATIENT)
Dept: OBSTETRICS AND GYNECOLOGY | Facility: CLINIC | Age: 34
End: 2019-09-13
Payer: MEDICAID

## 2019-09-13 VITALS
HEIGHT: 66 IN | DIASTOLIC BLOOD PRESSURE: 64 MMHG | SYSTOLIC BLOOD PRESSURE: 110 MMHG | BODY MASS INDEX: 24.96 KG/M2 | WEIGHT: 155.31 LBS

## 2019-09-13 DIAGNOSIS — Z30.431 ENCOUNTER FOR ROUTINE CHECKING OF INTRAUTERINE CONTRACEPTIVE DEVICE (IUD): ICD-10-CM

## 2019-09-13 DIAGNOSIS — Z09 POSTOP CHECK: Primary | ICD-10-CM

## 2019-09-13 PROCEDURE — 99024 PR POST-OP FOLLOW-UP VISIT: ICD-10-PCS | Mod: ,,, | Performed by: OBSTETRICS & GYNECOLOGY

## 2019-09-13 PROCEDURE — 99999 PR PBB SHADOW E&M-EST. PATIENT-LVL III: ICD-10-PCS | Mod: PBBFAC,,, | Performed by: OBSTETRICS & GYNECOLOGY

## 2019-09-13 PROCEDURE — 99213 OFFICE O/P EST LOW 20 MIN: CPT | Mod: PBBFAC,PN | Performed by: OBSTETRICS & GYNECOLOGY

## 2019-09-13 PROCEDURE — 99999 PR PBB SHADOW E&M-EST. PATIENT-LVL III: CPT | Mod: PBBFAC,,, | Performed by: OBSTETRICS & GYNECOLOGY

## 2019-09-13 PROCEDURE — 99024 POSTOP FOLLOW-UP VISIT: CPT | Mod: ,,, | Performed by: OBSTETRICS & GYNECOLOGY

## 2019-09-13 NOTE — PROGRESS NOTES
History & Physical  Gynecology      SUBJECTIVE:     Chief Complaint: String Check       History of Present Illness:  Ms. Flores is a 34 yr old female who presents postop check and IUD surveillance. She is s/p hysteroscopy with IUD removal and reinsertion. No complaints. Denies vaginal bleeding, pelvic pain.       Review of patient's allergies indicates:  No Known Allergies    Past Medical History:   Diagnosis Date    Anxiety     Cervical dysplasia     s/p LEEP    Depression     Ovarian cyst      Past Surgical History:   Procedure Laterality Date    APPENDECTOMY      BREAST SURGERY      CERVICAL BIOPSY  W/ LOOP ELECTRODE EXCISION      HYSTEROSCOPY, THERAPEUTIC  2019    Performed by Amnauel Arredondo MD at [facility] [place]    INSERTION, INTRAUTERINE DEVICE  2019    Performed by Amanuel Arredondo MD at [facility] [place]    OVARY SURGERY      REMOVAL, INTRAUTERINE DEVICE  2019    Performed by Amanuel [facility]redondo MD at [facility] [place]    SHOULDER SURGERY       OB History        2    Para   2    Term   2            AB        Living   2       SAB        TAB        Ectopic        Multiple        Live Births   2               Family History   Problem Relation Age of Onset    Lupus Unknown     Arthritis Mother     Diabetes Mother     Rheum arthritis Mother     Hyperlipidemia Father     Diabetes Father     Hypertension Father     Breast cancer Paternal Aunt      Social History     Tobacco Use    Smoking status: Current Every Day Smoker     Packs/day: 1.50     Types: Cigarettes    Smokeless tobacco: Never Used   Substance Use Topics    Alcohol use: Yes     Comment: social     Drug use: No       Current Outpatient Medications   Medication Sig    buPROPion (WELLBUTRIN SR) 150 MG TBSR 12 hr tablet Take 1 tablet (150 mg total) by mouth once daily for 3 days, THEN 1 tablet (150 mg total) 2 (two) times daily.    levonorgestrel (MIRENA) 20 mcg/24 hours (5 yrs) 52 mg IUD 1 Intra Uterine Device by Intrauterine  route once. RETURN FOR INTRAUTERINE INSERTION IN THE OFFICE for 1 dose    sertraline (ZOLOFT) 100 MG tablet Take 1 tablet (100 mg total) by mouth once daily.     No current facility-administered medications for this visit.          Review of Systems:  Review of Systems   Constitutional: Negative for activity change, fatigue, fever and unexpected weight change.   Respiratory: Negative for cough and shortness of breath.    Cardiovascular: Negative for chest pain and palpitations.   Gastrointestinal: Negative for abdominal pain, constipation, diarrhea and nausea.   Endocrine: Negative for hot flashes.   Genitourinary: Negative for dyspareunia, dysuria, menorrhagia, menstrual problem, pelvic pain and vaginal discharge.   Musculoskeletal: Negative for back pain.   Integumentary:  Negative for nipple discharge.   Neurological: Negative for headaches.   Psychiatric/Behavioral: The patient is not nervous/anxious.    Breast: Negative for nipple discharge       OBJECTIVE:     Physical Exam:  Physical Exam   Constitutional: She is oriented to person, place, and time. She appears well-developed and well-nourished.   HENT:   Head: Normocephalic and atraumatic.   Neck: Normal range of motion. Neck supple.   Cardiovascular: Normal rate, regular rhythm, normal heart sounds and intact distal pulses.   Pulmonary/Chest: Effort normal and breath sounds normal.   Abdominal: Soft. Bowel sounds are normal. There is no tenderness. There is no guarding.   Genitourinary: There is no rash, tenderness, lesion or injury on the right labia. There is no rash, tenderness, lesion or injury on the left labia. Uterus is not deviated, not enlarged, not fixed and not tender. Cervix exhibits no motion tenderness, no discharge and no friability. Right adnexum displays no mass, no tenderness and no fullness. Left adnexum displays no mass, no tenderness and no fullness. No erythema, tenderness or bleeding in the vagina. No foreign body in the vagina. No  signs of injury around the vagina. No vaginal discharge found.   Genitourinary Comments: IUD strings visualized at external os   Neurological: She is alert and oriented to person, place, and time.   Skin: Skin is warm and dry.   Psychiatric: She has a normal mood and affect.   Vitals reviewed.        ASSESSMENT:       ICD-10-CM ICD-9-CM    1. Postop check Z09 V67.00    2. Encounter for routine checking of intrauterine contraceptive device (IUD) Z30.431 V25.42           Plan:      Blanca BURNS was seen today for string check.    Diagnoses and all orders for this visit:    Postop check    Encounter for routine checking of intrauterine contraceptive device (IUD)    Benign postop course. No complaints. Strings visualized.    No orders of the defined types were placed in this encounter.      Follow up in about 1 year (around 9/13/2020), or if symptoms worsen or fail to improve, for annual.     Counseling time: 15 minutes    Amanuel Arredondo

## 2020-01-09 PROBLEM — L02.419 ANKLE ABSCESS: Status: ACTIVE | Noted: 2020-01-09

## 2020-01-12 PROBLEM — N17.9 AKI (ACUTE KIDNEY INJURY): Status: ACTIVE | Noted: 2020-01-12

## 2020-10-05 ENCOUNTER — PATIENT MESSAGE (OUTPATIENT)
Dept: ADMINISTRATIVE | Facility: HOSPITAL | Age: 35
End: 2020-10-05

## 2020-11-23 ENCOUNTER — OFFICE VISIT (OUTPATIENT)
Dept: PRIMARY CARE CLINIC | Facility: CLINIC | Age: 35
End: 2020-11-23
Payer: MEDICAID

## 2020-11-23 VITALS
HEIGHT: 67 IN | OXYGEN SATURATION: 96 % | WEIGHT: 162.56 LBS | BODY MASS INDEX: 25.52 KG/M2 | TEMPERATURE: 98 F | HEART RATE: 86 BPM | DIASTOLIC BLOOD PRESSURE: 70 MMHG | RESPIRATION RATE: 16 BRPM | SYSTOLIC BLOOD PRESSURE: 110 MMHG

## 2020-11-23 DIAGNOSIS — Z12.4 CERVICAL CANCER SCREENING: ICD-10-CM

## 2020-11-23 DIAGNOSIS — Z00.00 ANNUAL PHYSICAL EXAM: Primary | ICD-10-CM

## 2020-11-23 DIAGNOSIS — F17.210 CIGARETTE NICOTINE DEPENDENCE WITHOUT COMPLICATION: ICD-10-CM

## 2020-11-23 DIAGNOSIS — Z13.6 ENCOUNTER FOR SCREENING FOR CARDIOVASCULAR DISORDERS: ICD-10-CM

## 2020-11-23 DIAGNOSIS — L91.0 KELOID SCAR: ICD-10-CM

## 2020-11-23 PROCEDURE — 99395 PREV VISIT EST AGE 18-39: CPT | Mod: S$PBB,,, | Performed by: FAMILY MEDICINE

## 2020-11-23 PROCEDURE — 99215 OFFICE O/P EST HI 40 MIN: CPT | Mod: PBBFAC,PN | Performed by: FAMILY MEDICINE

## 2020-11-23 PROCEDURE — 99395 PR PREVENTIVE VISIT,EST,18-39: ICD-10-PCS | Mod: S$PBB,,, | Performed by: FAMILY MEDICINE

## 2020-11-23 PROCEDURE — 99999 PR PBB SHADOW E&M-EST. PATIENT-LVL V: CPT | Mod: PBBFAC,,, | Performed by: FAMILY MEDICINE

## 2020-11-23 PROCEDURE — 99999 PR PBB SHADOW E&M-EST. PATIENT-LVL V: ICD-10-PCS | Mod: PBBFAC,,, | Performed by: FAMILY MEDICINE

## 2020-11-23 NOTE — PROGRESS NOTES
"Subjective:       Patient ID: Blanca Flores is a 35 y.o. female.    Chief Complaint: Wound Check (says she was bitten by a spider and it got infected about a year ago. She wants to scar checked )    Here for annual physical, and to discuss scar to her left lower leg.  She was admitted to the hospital in January with cellulitis with abscess formation to anterior surface of her left lower leg.  Underwent bedside incision and drainage, wound culture positive for MRSA.  Infection successfully treated with antibiotics, but has a discolored scar, and she is dissatisfied with the cosmetic outcome.  Still has occasional mild discomfort at site, but no redness, warmth or drainage    Review of Systems   Constitutional: Negative for activity change and unexpected weight change.   HENT: Negative for hearing loss, rhinorrhea and trouble swallowing.    Eyes: Negative for discharge and visual disturbance.   Respiratory: Negative for chest tightness and wheezing.    Cardiovascular: Negative for chest pain and palpitations.   Gastrointestinal: Negative for blood in stool, constipation, diarrhea and vomiting.   Endocrine: Negative for polydipsia and polyuria.   Genitourinary: Negative for difficulty urinating, dysuria, hematuria and menstrual problem.   Musculoskeletal: Negative for arthralgias, joint swelling and neck pain.   Skin: Positive for color change and wound.   Allergic/Immunologic: Negative for immunocompromised state.   Neurological: Negative for weakness and headaches.   Hematological: Does not bruise/bleed easily.   Psychiatric/Behavioral: Negative for confusion and dysphoric mood.       Objective:      Vitals:    11/23/20 1523   BP: 110/70   BP Location: Left arm   Patient Position: Sitting   BP Method: Medium (Manual)   Pulse: 86   Resp: 16   Temp: 97.9 °F (36.6 °C)   TempSrc: Temporal   SpO2: 96%   Weight: 73.8 kg (162 lb 9.4 oz)   Height: 5' 7" (1.702 m)     Physical Exam  Vitals signs and nursing note reviewed. "   Constitutional:       Appearance: She is well-developed.   HENT:      Head: Normocephalic and atraumatic.   Eyes:      Pupils: Pupils are equal, round, and reactive to light.   Neck:      Musculoskeletal: Neck supple.      Vascular: No carotid bruit or JVD.   Cardiovascular:      Rate and Rhythm: Normal rate and regular rhythm.      Pulses:           Radial pulses are 2+ on the right side and 2+ on the left side.      Heart sounds: Normal heart sounds.   Pulmonary:      Effort: Pulmonary effort is normal.      Breath sounds: Normal breath sounds.   Abdominal:      General: Bowel sounds are normal. There is no distension.      Palpations: Abdomen is soft.      Tenderness: There is no abdominal tenderness.   Skin:     General: Skin is warm and dry.          Neurological:      Mental Status: She is alert and oriented to person, place, and time.   Psychiatric:         Behavior: Behavior normal.         Lab Results   Component Value Date    WBC 11.10 01/14/2020    HGB 11.6 (L) 01/14/2020    HCT 35.9 (L) 01/14/2020     01/14/2020    CHOL 161 12/24/2018    TRIG 63 12/24/2018    HDL 37 (L) 12/24/2018    ALT 20 01/14/2020    AST 21 01/14/2020     01/14/2020    K 4.0 01/14/2020     01/14/2020    CREATININE 1.4 01/14/2020    BUN 8 01/14/2020    CO2 21 (L) 01/14/2020    TSH 1.00 01/10/2020          Assessment:       1. Annual physical exam    2. Keloid scar    3. Encounter for screening for cardiovascular disorders    4. Cigarette nicotine dependence without complication    5. Cervical cancer screening        Plan:       Annual physical exam  -     CBC Auto Differential; Future; Expected date: 11/23/2020  -     Comprehensive Metabolic Panel; Future; Expected date: 11/23/2020  -     Lipid Panel; Future; Expected date: 11/23/2020    Keloid scar  -     Ambulatory referral/consult to Dermatology; Future; Expected date: 11/30/2020    Encounter for screening for cardiovascular disorders  -     CBC Auto  Differential; Future; Expected date: 11/23/2020  -     Comprehensive Metabolic Panel; Future; Expected date: 11/23/2020  -     Lipid Panel; Future; Expected date: 11/23/2020    Cigarette nicotine dependence without complication  -     Ambulatory referral/consult to Smoking Cessation Program; Future; Expected date: 11/30/2020    Cervical cancer screening  Has appointment with gyn later this week    Medication List with Changes/Refills   Current Medications    LEVONORGESTREL (MIRENA) 20 MCG/24 HOURS (5 YRS) 52 MG IUD    1 Intra Uterine Device by Intrauterine route once. RETURN FOR INTRAUTERINE INSERTION IN THE OFFICE for 1 dose   Discontinued Medications    BUPROPION (WELLBUTRIN SR) 150 MG TBSR 12 HR TABLET    Take 1 tablet (150 mg total) by mouth once daily for 3 days, THEN 1 tablet (150 mg total) 2 (two) times daily.

## 2020-11-27 ENCOUNTER — OFFICE VISIT (OUTPATIENT)
Dept: OBSTETRICS AND GYNECOLOGY | Facility: CLINIC | Age: 35
End: 2020-11-27
Payer: MEDICAID

## 2020-11-27 VITALS
DIASTOLIC BLOOD PRESSURE: 70 MMHG | SYSTOLIC BLOOD PRESSURE: 110 MMHG | WEIGHT: 161.06 LBS | BODY MASS INDEX: 25.22 KG/M2

## 2020-11-27 DIAGNOSIS — Z11.3 SCREEN FOR STD (SEXUALLY TRANSMITTED DISEASE): ICD-10-CM

## 2020-11-27 DIAGNOSIS — Z01.419 ENCOUNTER FOR WELL WOMAN EXAM WITH ROUTINE GYNECOLOGICAL EXAM: Primary | ICD-10-CM

## 2020-11-27 DIAGNOSIS — Z30.431 IUD CHECK UP: ICD-10-CM

## 2020-11-27 DIAGNOSIS — N89.8 VAGINAL DISCHARGE: ICD-10-CM

## 2020-11-27 DIAGNOSIS — N76.0 ACUTE VAGINITIS: ICD-10-CM

## 2020-11-27 PROCEDURE — 99395 PREV VISIT EST AGE 18-39: CPT | Mod: S$PBB,,, | Performed by: NURSE PRACTITIONER

## 2020-11-27 PROCEDURE — 99213 OFFICE O/P EST LOW 20 MIN: CPT | Mod: PBBFAC,PN | Performed by: NURSE PRACTITIONER

## 2020-11-27 PROCEDURE — 99999 PR PBB SHADOW E&M-EST. PATIENT-LVL III: ICD-10-PCS | Mod: PBBFAC,,, | Performed by: NURSE PRACTITIONER

## 2020-11-27 PROCEDURE — 88142 CYTOPATH C/V THIN LAYER: CPT

## 2020-11-27 PROCEDURE — 99999 PR PBB SHADOW E&M-EST. PATIENT-LVL III: CPT | Mod: PBBFAC,,, | Performed by: NURSE PRACTITIONER

## 2020-11-27 PROCEDURE — 87624 HPV HI-RISK TYP POOLED RSLT: CPT

## 2020-11-27 PROCEDURE — 99395 PR PREVENTIVE VISIT,EST,18-39: ICD-10-PCS | Mod: S$PBB,,, | Performed by: NURSE PRACTITIONER

## 2020-11-27 RX ORDER — METRONIDAZOLE 7.5 MG/G
1 GEL VAGINAL DAILY
Qty: 70 G | Refills: 0 | Status: SHIPPED | OUTPATIENT
Start: 2020-11-27 | End: 2020-12-02

## 2020-11-27 RX ORDER — FLUCONAZOLE 150 MG/1
150 TABLET ORAL ONCE
Qty: 1 TABLET | Refills: 1 | Status: SHIPPED | OUTPATIENT
Start: 2020-11-27 | End: 2020-11-27

## 2020-11-27 NOTE — PROGRESS NOTES
Chief Complaint: Well Woman Exam     HPI:      Blanca Flores is a 35 y.o.  who presents today for well woman exam.   Patient is not currently sexually active. She is currently using Mirena inserted in 2019 for contraception. She would like STD screening today- relapsed with IVDU. Ms. Flores confirms that she is safe at home.  Ms. Flores denies abnormal vaginal bleeding, discharge, pelvic pain, urinary problems, or changes in appetite.  Amenorrhea with Mirena.   Reports vaginal irritation and discharge x 1 week.     Previous Pap:  NILM/ HPV neg (2017) S/P LEEP in [id number] with Dr. Hay.    Family History   Problem Relation Age of Onset    Lupus Unknown     Arthritis Mother     Diabetes Mother     Rheum arthritis Mother     Hyperlipidemia Father     Diabetes Father     Hypertension Father     Breast cancer Paternal Aunt     Colon cancer Paternal Grandmother     Ovarian cancer Maternal Aunt     Endometrial cancer Neg Hx      OB History        2    Para   2    Term   2            AB        Living   2       SAB        TAB        Ectopic        Multiple        Live Births   2                 ROS:     GENERAL: Denies unintentional weight gain or weight loss. Feeling well overall.   SKIN: Denies rash or lesions.   HEENT: Denies headaches, or vision changes.   CARDIOVASCULAR: Denies palpitations or chest pain.   RESPIRATORY: Denies shortness of breath or dyspnea on exertion.  BREASTS: Denies pain, lumps, or nipple discharge.   ABDOMEN: Denies abdominal pain, constipation, diarrhea, nausea, vomiting, change in appetite.  URINARY: Denies frequency, dysuria, hematuria.  NEUROLOGIC: Denies syncope or weakness.   PSYCHIATRIC: Denies depression, anxiety or mood swings.    Physical Exam:      PHYSICAL EXAM:  /70   Wt 73.1 kg (161 lb 0.7 oz)   BMI 25.22 kg/m²   Body mass index is 25.22 kg/m².     APPEARANCE: Well nourished, well developed, in no acute distress.  PSYCH:  Appropriate mood and affect.  SKIN: No acne or hirsutism  NECK: Neck symmetric without masses or thyromegaly  NODES: No inguinal, axillary, or supraclavicular lymph node enlargement  ABDOMEN: Soft.  No tenderness or masses.    CARDIOVASCULAR: No edema of peripheral extremities  BREASTS: Symmetrical, no skin changes or visible lesions.  No palpable masses or nipple discharge bilaterally.  PELVIC: Normal external genitalia without lesions.  Normal hair distribution.  Adequate perineal body, normal urethral meatus.  Vagina moist and well rugated without lesions. + copious thick, white/yellow discharge.  Cervix pink, without lesions, discharge or tenderness.  No significant cystocele or rectocele.  Bimanual exam shows uterus to be normal size, regular, mobile and nontender.  Adnexa without masses or tenderness.      Assessment/Plan:     Encounter for well woman exam with routine gynecological exam  -     Liquid-Based Pap Smear, Screening  -     HPV High Risk Genotypes, PCR    Screen for STD (sexually transmitted disease)  -     HIV 1/2 Ag/Ab (4th Gen); Future; Expected date: 11/27/2020  -     Hepatitis Panel, Acute; Future; Expected date: 11/27/2020  -     RPR; Future; Expected date: 11/27/2020  -     Vaginosis Screen by DNA Probe    IUD check up    Vaginal discharge  -     fluconazole (DIFLUCAN) 150 MG Tab; Take 1 tablet (150 mg total) by mouth once. REFILL AND RE-DOSE IF SYMPTOMS RECUR for 1 dose  Dispense: 1 tablet; Refill: 1  -     metroNIDAZOLE (METROGEL VAGINAL) 0.75 % vaginal gel; Place 1 applicator vaginally once daily. for 5 days  Dispense: 70 g; Refill: 0    Acute vaginitis  -     fluconazole (DIFLUCAN) 150 MG Tab; Take 1 tablet (150 mg total) by mouth once. REFILL AND RE-DOSE IF SYMPTOMS RECUR for 1 dose  Dispense: 1 tablet; Refill: 1  -     metroNIDAZOLE (METROGEL VAGINAL) 0.75 % vaginal gel; Place 1 applicator vaginally once daily. for 5 days  Dispense: 70 g; Refill: 0        Counseling:     Patient was  counseled today on current ASCCP pap guidelines, the recommendation for yearly pelvic exams, healthy diet and exercise routines, breast self awareness.She is to see her PCP for other health maintenance.

## 2020-12-02 LAB
CANDIDA RRNA VAG QL PROBE: POSITIVE
G VAGINALIS RRNA GENITAL QL PROBE: NEGATIVE
T VAGINALIS RRNA GENITAL QL PROBE: NEGATIVE

## 2020-12-03 ENCOUNTER — CLINICAL SUPPORT (OUTPATIENT)
Dept: SMOKING CESSATION | Facility: CLINIC | Age: 35
End: 2020-12-03
Payer: MEDICAID

## 2020-12-03 DIAGNOSIS — F17.200 NICOTINE DEPENDENCE: ICD-10-CM

## 2020-12-03 PROCEDURE — 99404 PR PREVENT COUNSEL,INDIV,60 MIN: ICD-10-PCS | Mod: S$GLB,,,

## 2020-12-03 PROCEDURE — 99404 PREV MED CNSL INDIV APPRX 60: CPT | Mod: S$GLB,,,

## 2020-12-03 RX ORDER — IBUPROFEN 200 MG
1 TABLET ORAL DAILY
Qty: 14 PATCH | Refills: 0 | Status: SHIPPED | OUTPATIENT
Start: 2020-12-03 | End: 2021-09-20 | Stop reason: CLARIF

## 2020-12-03 RX ORDER — VARENICLINE TARTRATE 0.5 (11)-1
KIT ORAL
Qty: 53 TABLET | Refills: 0 | Status: SHIPPED | OUTPATIENT
Start: 2020-12-03 | End: 2021-01-04 | Stop reason: ALTCHOICE

## 2020-12-03 NOTE — Clinical Note
Pt seen at intake today. She currently smokes 10 cigs/day. Discussed tobacco cessation medication of chantix starter pack and 21 mg nicotine patch QD. Pt started on rate reduction and wait time of 15 min prior to smoking. Exhaled carbon monoxide level was 16 (0-6 non-smoker). Will see pt back in office in 1 wk.

## 2020-12-04 LAB
HPV HR 12 DNA SPEC QL NAA+PROBE: NEGATIVE
HPV16 AG SPEC QL: NEGATIVE
HPV18 DNA SPEC QL NAA+PROBE: NEGATIVE

## 2020-12-10 ENCOUNTER — CLINICAL SUPPORT (OUTPATIENT)
Dept: SMOKING CESSATION | Facility: CLINIC | Age: 35
End: 2020-12-10
Payer: MEDICAID

## 2020-12-10 DIAGNOSIS — F17.200 NICOTINE DEPENDENCE: ICD-10-CM

## 2020-12-10 PROCEDURE — 99402 PR PREVENT COUNSEL,INDIV,30 MIN: ICD-10-PCS | Mod: S$GLB,,,

## 2020-12-10 PROCEDURE — 99402 PREV MED CNSL INDIV APPRX 30: CPT | Mod: S$GLB,,,

## 2020-12-10 NOTE — Clinical Note
Pt seen in office today. She continues to smoke 15 cigs/day. Pt remains on tobacco cessation medication of chantix starter pack and 21 mg nicotine patch QD. She started chantix Tuesday and states that cravings in the evening for tobacco are decreased. Day time is problematic due to job stress. No adverse effects/mental changes noted at this time. Pt asked to reduce current smoking rate by 3 cigs/day. Reviewed coping strategies/stress management/habitual behavior with patient. Exhaled carbon monoxide level was 18 ppm per Smokerlyzer (0-6 non-smoker). Will see pt back in office in 1 wk.

## 2020-12-10 NOTE — PROGRESS NOTES
Individual Follow-Up Form    12/10/2020    Clinical Status of Patient: Outpatient    Length of Service: 30 minutes    Continuing Medication: yes  Chantix or Patches    Other Medications: none     Target Symptoms: Withdrawal and medication side effects. The following were rated moderate (3) to severe (4) on TCRS:  · Moderate (3): desire/crave tobacco  · Severe (4): none    Comments:  Pt seen in office today. She continues to smoke 15 cigs/day. Pt remains on tobacco cessation medication of chantix starter pack and 21 mg nicotine patch QD. She started chantix Tuesday and states that cravings in the evening for tobacco are decreased. Day time is problematic due to job stress. No adverse effects/mental changes noted at this time. Pt asked to reduce current smoking rate by 3 cigs/day. Reviewed coping strategies/stress management/habitual behavior with patient. Exhaled carbon monoxide level was 18 ppm per Smokerlyzer (0-6 non-smoker). Will see pt back in office in 1 wk.     Diagnosis: F17.200    Next Visit: 1 week

## 2020-12-17 ENCOUNTER — CLINICAL SUPPORT (OUTPATIENT)
Dept: SMOKING CESSATION | Facility: CLINIC | Age: 35
End: 2020-12-17
Payer: MEDICAID

## 2020-12-17 DIAGNOSIS — F17.200 NICOTINE DEPENDENCE: ICD-10-CM

## 2020-12-17 PROCEDURE — 99402 PR PREVENT COUNSEL,INDIV,30 MIN: ICD-10-PCS | Mod: S$GLB,,,

## 2020-12-17 PROCEDURE — 99402 PREV MED CNSL INDIV APPRX 30: CPT | Mod: S$GLB,,,

## 2020-12-17 NOTE — PROGRESS NOTES
Individual Follow-Up Form    12/17/2020    Clinical Status of Patient: Outpatient    Length of Service: 30 minutes    Continuing Medication: yes  Chantix or Patches    Other Medications: none     Target Symptoms: Withdrawal and medication side effects. The following were rated moderate (3) to severe (4) on TCRS:  · Moderate (3): desire/crave tobacco  · Severe (4): none    Comments:    Diagnosis: F17.200    Next Visit: 1 week

## 2020-12-17 NOTE — Clinical Note
Pt seen in office today. She continues to smoke 10 cigs/day. Pt remains on tobacco cessation medication of chantix starter pack and 21 mg nicotine patch QD. She states cravings for nicotine are down. No adverse effects/mental changes noted at this time. Pt asked to reduce current smoking rate by 2 cigs/day. Reviewed coping strategies/stress management/habitual behavior with patient. Exhaled carbon monoxide level was 10 ppm per Smokerlyzer (0-6 non-smoker). Will see pt back in office in 2 wks. Next week 12/24 and the following is 12/31 will see her the following week.

## 2020-12-23 ENCOUNTER — TELEPHONE (OUTPATIENT)
Dept: PRIMARY CARE CLINIC | Facility: CLINIC | Age: 35
End: 2020-12-23

## 2020-12-23 DIAGNOSIS — L91.0 KELOID SCAR: Primary | ICD-10-CM

## 2020-12-23 NOTE — TELEPHONE ENCOUNTER
----- Message from Priyanka Nicolas sent at 12/23/2020 10:35 AM CST -----  Contact: [phone]  Pt found dermatologist that does accept medicaid she needs new referral      Dr Isaacs is the  dr please call to advise

## 2021-01-04 RX ORDER — VARENICLINE TARTRATE 1 MG/1
1 TABLET, FILM COATED ORAL 2 TIMES DAILY
Qty: 60 TABLET | Refills: 0 | Status: SHIPPED | OUTPATIENT
Start: 2021-01-04 | End: 2021-09-20 | Stop reason: CLARIF

## 2021-01-07 ENCOUNTER — CLINICAL SUPPORT (OUTPATIENT)
Dept: SMOKING CESSATION | Facility: CLINIC | Age: 36
End: 2021-01-07

## 2021-01-07 DIAGNOSIS — F17.200 NICOTINE DEPENDENCE: ICD-10-CM

## 2021-01-07 PROCEDURE — 99401 PR PREVENT COUNSEL,INDIV,15 MIN: ICD-10-PCS | Mod: S$GLB,,,

## 2021-01-07 PROCEDURE — 99401 PREV MED CNSL INDIV APPRX 15: CPT | Mod: S$GLB,,,

## 2021-01-12 LAB
FINAL PATHOLOGIC DIAGNOSIS: NORMAL
Lab: NORMAL

## 2021-06-30 ENCOUNTER — TELEPHONE (OUTPATIENT)
Dept: SMOKING CESSATION | Facility: CLINIC | Age: 36
End: 2021-06-30

## 2021-07-29 ENCOUNTER — TELEPHONE (OUTPATIENT)
Dept: SMOKING CESSATION | Facility: CLINIC | Age: 36
End: 2021-07-29

## 2021-08-19 ENCOUNTER — TELEPHONE (OUTPATIENT)
Dept: SMOKING CESSATION | Facility: CLINIC | Age: 36
End: 2021-08-19

## 2021-11-23 ENCOUNTER — OFFICE VISIT (OUTPATIENT)
Dept: PRIMARY CARE CLINIC | Facility: CLINIC | Age: 36
End: 2021-11-23
Payer: MEDICAID

## 2021-11-23 VITALS
HEART RATE: 99 BPM | BODY MASS INDEX: 27.27 KG/M2 | WEIGHT: 173.75 LBS | SYSTOLIC BLOOD PRESSURE: 104 MMHG | RESPIRATION RATE: 18 BRPM | OXYGEN SATURATION: 99 % | HEIGHT: 67 IN | DIASTOLIC BLOOD PRESSURE: 72 MMHG

## 2021-11-23 DIAGNOSIS — Z23 NEED FOR VACCINATION: ICD-10-CM

## 2021-11-23 DIAGNOSIS — Z01.419 ROUTINE GYNECOLOGICAL EXAMINATION: ICD-10-CM

## 2021-11-23 DIAGNOSIS — G89.29 CHRONIC RIGHT HIP PAIN: ICD-10-CM

## 2021-11-23 DIAGNOSIS — Z20.5 EXPOSURE TO VIRAL HEPATITIS: ICD-10-CM

## 2021-11-23 DIAGNOSIS — H91.93 BILATERAL HEARING LOSS, UNSPECIFIED HEARING LOSS TYPE: ICD-10-CM

## 2021-11-23 DIAGNOSIS — F17.210 CIGARETTE NICOTINE DEPENDENCE WITHOUT COMPLICATION: ICD-10-CM

## 2021-11-23 DIAGNOSIS — Z00.00 ANNUAL PHYSICAL EXAM: Primary | ICD-10-CM

## 2021-11-23 DIAGNOSIS — M25.551 CHRONIC RIGHT HIP PAIN: ICD-10-CM

## 2021-11-23 PROCEDURE — 99215 OFFICE O/P EST HI 40 MIN: CPT | Mod: PBBFAC,PN | Performed by: FAMILY MEDICINE

## 2021-11-23 PROCEDURE — 99999 PR PBB SHADOW E&M-EST. PATIENT-LVL V: CPT | Mod: PBBFAC,,, | Performed by: FAMILY MEDICINE

## 2021-11-23 PROCEDURE — 99999 PR PBB SHADOW E&M-EST. PATIENT-LVL V: ICD-10-PCS | Mod: PBBFAC,,, | Performed by: FAMILY MEDICINE

## 2021-11-23 PROCEDURE — 99395 PR PREVENTIVE VISIT,EST,18-39: ICD-10-PCS | Mod: S$PBB,,, | Performed by: FAMILY MEDICINE

## 2021-11-23 PROCEDURE — 99395 PREV VISIT EST AGE 18-39: CPT | Mod: S$PBB,,, | Performed by: FAMILY MEDICINE

## 2021-11-23 PROCEDURE — 90471 IMMUNIZATION ADMIN: CPT | Mod: PBBFAC,PN

## 2021-12-21 ENCOUNTER — CLINICAL SUPPORT (OUTPATIENT)
Dept: AUDIOLOGY | Facility: CLINIC | Age: 36
End: 2021-12-21
Payer: MEDICAID

## 2021-12-21 ENCOUNTER — OFFICE VISIT (OUTPATIENT)
Dept: OTOLARYNGOLOGY | Facility: CLINIC | Age: 36
End: 2021-12-21
Payer: MEDICAID

## 2021-12-21 VITALS — HEART RATE: 81 BPM | SYSTOLIC BLOOD PRESSURE: 111 MMHG | DIASTOLIC BLOOD PRESSURE: 79 MMHG

## 2021-12-21 DIAGNOSIS — H91.93 BILATERAL HEARING LOSS, UNSPECIFIED HEARING LOSS TYPE: ICD-10-CM

## 2021-12-21 DIAGNOSIS — H93.13 TINNITUS OF BOTH EARS: Primary | ICD-10-CM

## 2021-12-21 PROCEDURE — 99203 PR OFFICE/OUTPT VISIT, NEW, LEVL III, 30-44 MIN: ICD-10-PCS | Mod: S$PBB,,, | Performed by: OTOLARYNGOLOGY

## 2021-12-21 PROCEDURE — 99999 PR PBB SHADOW E&M-EST. PATIENT-LVL III: ICD-10-PCS | Mod: PBBFAC,,, | Performed by: OTOLARYNGOLOGY

## 2021-12-21 PROCEDURE — 99211 OFF/OP EST MAY X REQ PHY/QHP: CPT | Mod: PBBFAC,27 | Performed by: AUDIOLOGIST

## 2021-12-21 PROCEDURE — 99999 PR PBB SHADOW E&M-EST. PATIENT-LVL I: ICD-10-PCS | Mod: PBBFAC,,, | Performed by: AUDIOLOGIST

## 2021-12-21 PROCEDURE — 99999 PR PBB SHADOW E&M-EST. PATIENT-LVL III: CPT | Mod: PBBFAC,,, | Performed by: OTOLARYNGOLOGY

## 2021-12-21 PROCEDURE — 99999 PR PBB SHADOW E&M-EST. PATIENT-LVL I: CPT | Mod: PBBFAC,,, | Performed by: AUDIOLOGIST

## 2021-12-21 PROCEDURE — 99203 OFFICE O/P NEW LOW 30 MIN: CPT | Mod: S$PBB,,, | Performed by: OTOLARYNGOLOGY

## 2021-12-21 PROCEDURE — 92567 TYMPANOMETRY: CPT | Mod: PBBFAC | Performed by: AUDIOLOGIST

## 2021-12-21 PROCEDURE — 99213 OFFICE O/P EST LOW 20 MIN: CPT | Mod: PBBFAC | Performed by: OTOLARYNGOLOGY

## 2021-12-21 PROCEDURE — 92557 COMPREHENSIVE HEARING TEST: CPT | Mod: PBBFAC | Performed by: AUDIOLOGIST

## 2021-12-23 ENCOUNTER — CLINICAL SUPPORT (OUTPATIENT)
Dept: SMOKING CESSATION | Facility: CLINIC | Age: 36
End: 2021-12-23

## 2021-12-23 DIAGNOSIS — F17.200 NICOTINE DEPENDENCE: Primary | ICD-10-CM

## 2021-12-23 PROCEDURE — 99407 BEHAV CHNG SMOKING > 10 MIN: CPT | Mod: S$GLB,,,

## 2021-12-23 PROCEDURE — 99407 PR TOBACCO USE CESSATION INTENSIVE >10 MINUTES: ICD-10-PCS | Mod: S$GLB,,,

## 2021-12-30 ENCOUNTER — OFFICE VISIT (OUTPATIENT)
Dept: OBSTETRICS AND GYNECOLOGY | Facility: CLINIC | Age: 36
End: 2021-12-30
Payer: MEDICAID

## 2021-12-30 ENCOUNTER — TELEPHONE (OUTPATIENT)
Dept: OBSTETRICS AND GYNECOLOGY | Facility: CLINIC | Age: 36
End: 2021-12-30

## 2021-12-30 VITALS
HEIGHT: 66 IN | SYSTOLIC BLOOD PRESSURE: 118 MMHG | DIASTOLIC BLOOD PRESSURE: 70 MMHG | BODY MASS INDEX: 27.56 KG/M2 | WEIGHT: 171.5 LBS

## 2021-12-30 DIAGNOSIS — Z01.419 ROUTINE GYNECOLOGICAL EXAMINATION: ICD-10-CM

## 2021-12-30 DIAGNOSIS — N83.202 LEFT OVARIAN CYST: Primary | ICD-10-CM

## 2021-12-30 DIAGNOSIS — Z01.419 ENCOUNTER FOR WELL WOMAN EXAM WITH ROUTINE GYNECOLOGICAL EXAM: Primary | ICD-10-CM

## 2021-12-30 DIAGNOSIS — Z30.431 IUD CHECK UP: ICD-10-CM

## 2021-12-30 PROCEDURE — 1160F RVW MEDS BY RX/DR IN RCRD: CPT | Mod: CPTII,,, | Performed by: NURSE PRACTITIONER

## 2021-12-30 PROCEDURE — 99395 PR PREVENTIVE VISIT,EST,18-39: ICD-10-PCS | Mod: S$PBB,,, | Performed by: NURSE PRACTITIONER

## 2021-12-30 PROCEDURE — 99999 PR PBB SHADOW E&M-EST. PATIENT-LVL III: ICD-10-PCS | Mod: PBBFAC,,, | Performed by: NURSE PRACTITIONER

## 2021-12-30 PROCEDURE — 3078F PR MOST RECENT DIASTOLIC BLOOD PRESSURE < 80 MM HG: ICD-10-PCS | Mod: CPTII,,, | Performed by: NURSE PRACTITIONER

## 2021-12-30 PROCEDURE — 1159F MED LIST DOCD IN RCRD: CPT | Mod: CPTII,,, | Performed by: NURSE PRACTITIONER

## 2021-12-30 PROCEDURE — 99999 PR PBB SHADOW E&M-EST. PATIENT-LVL III: CPT | Mod: PBBFAC,,, | Performed by: NURSE PRACTITIONER

## 2021-12-30 PROCEDURE — 3078F DIAST BP <80 MM HG: CPT | Mod: CPTII,,, | Performed by: NURSE PRACTITIONER

## 2021-12-30 PROCEDURE — 3074F SYST BP LT 130 MM HG: CPT | Mod: CPTII,,, | Performed by: NURSE PRACTITIONER

## 2021-12-30 PROCEDURE — 1160F PR REVIEW ALL MEDS BY PRESCRIBER/CLIN PHARMACIST DOCUMENTED: ICD-10-PCS | Mod: CPTII,,, | Performed by: NURSE PRACTITIONER

## 2021-12-30 PROCEDURE — 1159F PR MEDICATION LIST DOCUMENTED IN MEDICAL RECORD: ICD-10-PCS | Mod: CPTII,,, | Performed by: NURSE PRACTITIONER

## 2021-12-30 PROCEDURE — 3008F PR BODY MASS INDEX (BMI) DOCUMENTED: ICD-10-PCS | Mod: CPTII,,, | Performed by: NURSE PRACTITIONER

## 2021-12-30 PROCEDURE — 3008F BODY MASS INDEX DOCD: CPT | Mod: CPTII,,, | Performed by: NURSE PRACTITIONER

## 2021-12-30 PROCEDURE — 99395 PREV VISIT EST AGE 18-39: CPT | Mod: S$PBB,,, | Performed by: NURSE PRACTITIONER

## 2021-12-30 PROCEDURE — 3074F PR MOST RECENT SYSTOLIC BLOOD PRESSURE < 130 MM HG: ICD-10-PCS | Mod: CPTII,,, | Performed by: NURSE PRACTITIONER

## 2021-12-30 PROCEDURE — 99213 OFFICE O/P EST LOW 20 MIN: CPT | Mod: PBBFAC,PN | Performed by: NURSE PRACTITIONER

## 2022-01-04 ENCOUNTER — TELEPHONE (OUTPATIENT)
Dept: OBSTETRICS AND GYNECOLOGY | Facility: CLINIC | Age: 37
End: 2022-01-04
Payer: MEDICAID

## 2022-01-04 ENCOUNTER — OFFICE VISIT (OUTPATIENT)
Dept: ORTHOPEDICS | Facility: CLINIC | Age: 37
End: 2022-01-04
Payer: MEDICAID

## 2022-01-04 ENCOUNTER — PATIENT MESSAGE (OUTPATIENT)
Dept: OBSTETRICS AND GYNECOLOGY | Facility: CLINIC | Age: 37
End: 2022-01-04
Payer: MEDICAID

## 2022-01-04 VITALS
SYSTOLIC BLOOD PRESSURE: 119 MMHG | BODY MASS INDEX: 28.33 KG/M2 | HEART RATE: 86 BPM | RESPIRATION RATE: 18 BRPM | WEIGHT: 176.25 LBS | DIASTOLIC BLOOD PRESSURE: 78 MMHG | HEIGHT: 66 IN

## 2022-01-04 DIAGNOSIS — M25.551 CHRONIC RIGHT HIP PAIN: Primary | ICD-10-CM

## 2022-01-04 DIAGNOSIS — G89.29 CHRONIC RIGHT HIP PAIN: Primary | ICD-10-CM

## 2022-01-04 PROCEDURE — 3074F PR MOST RECENT SYSTOLIC BLOOD PRESSURE < 130 MM HG: ICD-10-PCS | Mod: CPTII,,, | Performed by: ORTHOPAEDIC SURGERY

## 2022-01-04 PROCEDURE — 1159F PR MEDICATION LIST DOCUMENTED IN MEDICAL RECORD: ICD-10-PCS | Mod: CPTII,,, | Performed by: ORTHOPAEDIC SURGERY

## 2022-01-04 PROCEDURE — 3078F DIAST BP <80 MM HG: CPT | Mod: CPTII,,, | Performed by: ORTHOPAEDIC SURGERY

## 2022-01-04 PROCEDURE — 3078F PR MOST RECENT DIASTOLIC BLOOD PRESSURE < 80 MM HG: ICD-10-PCS | Mod: CPTII,,, | Performed by: ORTHOPAEDIC SURGERY

## 2022-01-04 PROCEDURE — 99203 OFFICE O/P NEW LOW 30 MIN: CPT | Mod: S$PBB,,, | Performed by: ORTHOPAEDIC SURGERY

## 2022-01-04 PROCEDURE — 3008F BODY MASS INDEX DOCD: CPT | Mod: CPTII,,, | Performed by: ORTHOPAEDIC SURGERY

## 2022-01-04 PROCEDURE — 1159F MED LIST DOCD IN RCRD: CPT | Mod: CPTII,,, | Performed by: ORTHOPAEDIC SURGERY

## 2022-01-04 PROCEDURE — 1160F RVW MEDS BY RX/DR IN RCRD: CPT | Mod: CPTII,,, | Performed by: ORTHOPAEDIC SURGERY

## 2022-01-04 PROCEDURE — 99999 PR PBB SHADOW E&M-EST. PATIENT-LVL V: CPT | Mod: PBBFAC,,, | Performed by: ORTHOPAEDIC SURGERY

## 2022-01-04 PROCEDURE — 1160F PR REVIEW ALL MEDS BY PRESCRIBER/CLIN PHARMACIST DOCUMENTED: ICD-10-PCS | Mod: CPTII,,, | Performed by: ORTHOPAEDIC SURGERY

## 2022-01-04 PROCEDURE — 99215 OFFICE O/P EST HI 40 MIN: CPT | Mod: PBBFAC,PN | Performed by: ORTHOPAEDIC SURGERY

## 2022-01-04 PROCEDURE — 99999 PR PBB SHADOW E&M-EST. PATIENT-LVL V: ICD-10-PCS | Mod: PBBFAC,,, | Performed by: ORTHOPAEDIC SURGERY

## 2022-01-04 PROCEDURE — 99203 PR OFFICE/OUTPT VISIT, NEW, LEVL III, 30-44 MIN: ICD-10-PCS | Mod: S$PBB,,, | Performed by: ORTHOPAEDIC SURGERY

## 2022-01-04 PROCEDURE — 3074F SYST BP LT 130 MM HG: CPT | Mod: CPTII,,, | Performed by: ORTHOPAEDIC SURGERY

## 2022-01-04 PROCEDURE — 3008F PR BODY MASS INDEX (BMI) DOCUMENTED: ICD-10-PCS | Mod: CPTII,,, | Performed by: ORTHOPAEDIC SURGERY

## 2022-01-04 RX ORDER — FLUCONAZOLE 150 MG/1
TABLET ORAL
COMMUNITY
End: 2023-05-26

## 2022-01-04 RX ORDER — METRONIDAZOLE 7.5 MG/G
GEL VAGINAL
COMMUNITY
End: 2023-05-26

## 2022-01-04 RX ORDER — IBUPROFEN 200 MG
TABLET ORAL
COMMUNITY
End: 2023-05-26

## 2022-01-04 NOTE — TELEPHONE ENCOUNTER
Called patient to discuss possibly removing and replacing IUD due to radiologist   Stating that one arm appears to be embedded in uterine wall. Discussed that the mirena will still protect against pregnancy but replacing is not unreasonable especially if she is having symptoms. Patient reports stabbing pain to right pelvis 3 days ago. She would like to think about next steps and will return call to office after she has decided.

## 2022-01-04 NOTE — PATIENT INSTRUCTIONS
Please go down to the first floor to schedule your physical therapy appointment.   The number for physical therapy is [phone].

## 2022-01-04 NOTE — PROGRESS NOTES
Subjective:    Patient ID:  Blanca Flores is a 36 y.o. y.o. female who presents for initial visit for Pain, Swelling, and Numbness of the Right Hip      35 yo female reports few year h/o right hip pain, insidious onset. Pain localized to lateral hip, intermittent and aggravated with stair climbing and when she lies on her right side at night. Denies groin pain, hip popping/clicking/locking/stiffness, motor weakness, paresthesias or constitutional sxs. She has not had any specific treatment to date.             Past Medical History:   Diagnosis Date    Anxiety     Cervical dysplasia     s/p LEEP    Depression     Ovarian cyst         Past Surgical History:   Procedure Laterality Date    APPENDECTOMY      BREAST SURGERY      CERVICAL BIOPSY  W/ LOOP ELECTRODE EXCISION      HYSTEROSCOPIC SURGICAL PROCEDURE  8/16/2019    Procedure: HYSTEROSCOPY, THERAPEUTIC;  Surgeon: Amanuel Arredondo MD;  Location: [facility];  Service: OB/GYN;;    INTRAUTERINE DEVICE INSERTION  8/16/2019    Procedure: INSERTION, INTRAUTERINE DEVICE;  Surgeon: Amanuel Arredondo MD;  Location: [facility] OR;  Service: OB/GYN;;    OVARY SURGERY      REMOVAL OF INTRAUTERINE DEVICE (IUD)  8/16/2019    Procedure: REMOVAL, INTRAUTERINE DEVICE;  Surgeon: Amanuel Arredondo MD;  Location: [facility] OR;  Service: OB/GYN;;    SHOULDER SURGERY         Review of patient's allergies indicates:  No Known Allergies       Current Outpatient Medications:     fluconazole (DIFLUCAN) 150 MG Tab, fluconazole 150 mg tablet  TK ONE T PO ONE AND REPEAT DOSE IF SYMPTOMS RECUR, Disp: , Rfl:     levonorgestrel (MIRENA) 20 mcg/24 hours (5 yrs) 52 mg IUD, 1 Intra Uterine Device by Intrauterine route once. RETURN FOR INTRAUTERINE INSERTION IN THE OFFICE for 1 dose, Disp: 1 each, Rfl: 0    metroNIDAZOLE (VANDAZOLE) 0.75 % vaginal gel, Vandazole 0.75 % vaginal gel  I 1 APL VAGINALLY QD FOR 5 DAYS, Disp: , Rfl:     nicotine (NICODERM CQ) 21 mg/24 hr, nicotine 21 mg/24 hr daily  "transdermal patch  PLACE 1 PATCH ONTO THE SKIN D, Disp: , Rfl:     Social History     Socioeconomic History    Marital status:    Tobacco Use    Smoking status: Current Every Day Smoker     Packs/day: 1.50     Types: Cigarettes    Smokeless tobacco: Never Used   Substance and Sexual Activity    Alcohol use: Yes     Comment: social     Drug use: No    Sexual activity: Yes     Partners: Male     Birth control/protection: I.U.D.        Family History   Problem Relation Age of Onset    Lupus Unknown     Arthritis Mother     Diabetes Mother     Rheum arthritis Mother     Hyperlipidemia Father     Diabetes Father     Hypertension Father     Breast cancer Paternal Aunt     Colon cancer Paternal Grandmother     Ovarian cancer Maternal Aunt     Endometrial cancer Neg Hx         Review of Systems   Constitutional: Negative for chills and fever.   HENT: Negative for hearing loss.    Eyes: Negative for blurred vision.   Respiratory: Negative for shortness of breath.    Cardiovascular: Negative for chest pain.   Gastrointestinal: Negative for nausea and vomiting.   Genitourinary: Negative for dysuria.   Musculoskeletal: Negative for myalgias.   Skin: Negative for rash.   Neurological: Negative for speech change and loss of consciousness.   Endo/Heme/Allergies: Does not bruise/bleed easily.   Psychiatric/Behavioral: Positive for depression.        Objective:     /78 (BP Location: Left arm, Patient Position: Sitting, BP Method: Medium (Automatic))   Pulse 86   Resp 18   Ht 5' 6" (1.676 m)   Wt 79.9 kg (176 lb 4.1 oz)   BMI 28.45 kg/m²     Ortho Exam     37 yo female in NAD; alert, oriented x 3    Head: atraumatic  Eyes: EOM are normal. Right eye exhibits no discharge. Left eye exhibits no discharge  Cardiovascular: normal rate    Pulmonary/Chest: effort normal; no respiratory distress  Abdominal: soft    BLE: N/V intact    Right hip: tender GT; FROM; negative impingement " signs/Stinchfield      Imaging:     X-rays AP pelvis, AP/lateral right hip dated 11/23/2021 are independently reviewed by me and show well-maintained joint space; mild symmetric acetabular beaking; retained IUD noted.      Assessment & Plan:      1. Chronic right hip pain, likely greater trochanteric pain syndrome      1.  Findings, diagnosis, treatment options/risks/benefits were reviewed  2.  PT  3.  Maintain judicious activity modification/limitation as symptoms dictate  4.  Follow-up prn

## 2022-08-13 ENCOUNTER — PATIENT MESSAGE (OUTPATIENT)
Dept: PRIMARY CARE CLINIC | Facility: CLINIC | Age: 37
End: 2022-08-13
Payer: MEDICAID

## 2022-08-15 RX ORDER — LORAZEPAM 0.5 MG/1
0.5 TABLET ORAL 2 TIMES DAILY PRN
Qty: 15 TABLET | Refills: 0 | Status: SHIPPED | OUTPATIENT
Start: 2022-08-15 | End: 2023-05-26

## 2023-05-26 ENCOUNTER — OFFICE VISIT (OUTPATIENT)
Dept: PRIMARY CARE CLINIC | Facility: CLINIC | Age: 38
End: 2023-05-26
Payer: MEDICAID

## 2023-05-26 VITALS
RESPIRATION RATE: 18 BRPM | SYSTOLIC BLOOD PRESSURE: 120 MMHG | WEIGHT: 166.25 LBS | DIASTOLIC BLOOD PRESSURE: 72 MMHG | BODY MASS INDEX: 26.72 KG/M2 | OXYGEN SATURATION: 97 % | TEMPERATURE: 98 F | HEART RATE: 91 BPM | HEIGHT: 66 IN

## 2023-05-26 DIAGNOSIS — Z00.00 ANNUAL PHYSICAL EXAM: Primary | ICD-10-CM

## 2023-05-26 DIAGNOSIS — Z13.1 SCREENING FOR DIABETES MELLITUS: ICD-10-CM

## 2023-05-26 DIAGNOSIS — F17.200 VAPING NICOTINE DEPENDENCE, NON-TOBACCO PRODUCT: ICD-10-CM

## 2023-05-26 DIAGNOSIS — Z23 NEED FOR VACCINATION: ICD-10-CM

## 2023-05-26 PROBLEM — L02.419 ANKLE ABSCESS: Status: RESOLVED | Noted: 2020-01-09 | Resolved: 2023-05-26

## 2023-05-26 PROBLEM — N17.9 AKI (ACUTE KIDNEY INJURY): Status: RESOLVED | Noted: 2020-01-12 | Resolved: 2023-05-26

## 2023-05-26 PROCEDURE — 90677 PCV20 VACCINE IM: CPT | Mod: PBBFAC,PN

## 2023-05-26 PROCEDURE — 1159F MED LIST DOCD IN RCRD: CPT | Mod: CPTII,,, | Performed by: FAMILY MEDICINE

## 2023-05-26 PROCEDURE — 1160F PR REVIEW ALL MEDS BY PRESCRIBER/CLIN PHARMACIST DOCUMENTED: ICD-10-PCS | Mod: CPTII,,, | Performed by: FAMILY MEDICINE

## 2023-05-26 PROCEDURE — 1160F RVW MEDS BY RX/DR IN RCRD: CPT | Mod: CPTII,,, | Performed by: FAMILY MEDICINE

## 2023-05-26 PROCEDURE — 3078F DIAST BP <80 MM HG: CPT | Mod: CPTII,,, | Performed by: FAMILY MEDICINE

## 2023-05-26 PROCEDURE — 1159F PR MEDICATION LIST DOCUMENTED IN MEDICAL RECORD: ICD-10-PCS | Mod: CPTII,,, | Performed by: FAMILY MEDICINE

## 2023-05-26 PROCEDURE — 99213 OFFICE O/P EST LOW 20 MIN: CPT | Mod: PBBFAC,PN | Performed by: FAMILY MEDICINE

## 2023-05-26 PROCEDURE — 3008F PR BODY MASS INDEX (BMI) DOCUMENTED: ICD-10-PCS | Mod: CPTII,,, | Performed by: FAMILY MEDICINE

## 2023-05-26 PROCEDURE — 99395 PREV VISIT EST AGE 18-39: CPT | Mod: S$PBB,,, | Performed by: FAMILY MEDICINE

## 2023-05-26 PROCEDURE — 3008F BODY MASS INDEX DOCD: CPT | Mod: CPTII,,, | Performed by: FAMILY MEDICINE

## 2023-05-26 PROCEDURE — 99999 PR PBB SHADOW E&M-EST. PATIENT-LVL III: ICD-10-PCS | Mod: PBBFAC,,, | Performed by: FAMILY MEDICINE

## 2023-05-26 PROCEDURE — 3074F SYST BP LT 130 MM HG: CPT | Mod: CPTII,,, | Performed by: FAMILY MEDICINE

## 2023-05-26 PROCEDURE — 3078F PR MOST RECENT DIASTOLIC BLOOD PRESSURE < 80 MM HG: ICD-10-PCS | Mod: CPTII,,, | Performed by: FAMILY MEDICINE

## 2023-05-26 PROCEDURE — 99999 PR PBB SHADOW E&M-EST. PATIENT-LVL III: CPT | Mod: PBBFAC,,, | Performed by: FAMILY MEDICINE

## 2023-05-26 PROCEDURE — 3074F PR MOST RECENT SYSTOLIC BLOOD PRESSURE < 130 MM HG: ICD-10-PCS | Mod: CPTII,,, | Performed by: FAMILY MEDICINE

## 2023-05-26 PROCEDURE — 99395 PR PREVENTIVE VISIT,EST,18-39: ICD-10-PCS | Mod: S$PBB,,, | Performed by: FAMILY MEDICINE

## 2023-05-26 NOTE — PROGRESS NOTES
Verified pt by name and . NKDA. Per physician orders pt was administered Prevnar 20 0.5 mL IM to left deltoid using aseptic technique. Pt tolerated well. No adverse effects or pain reported. MD notified.

## 2023-05-26 NOTE — PROGRESS NOTES
"Subjective:       Patient ID: Blanca Flores is a 37 y.o. female.    Chief Complaint: Annual Exam    Blanca Flores is a 37 y.o. female seen today for a routine checkup. The patient has no specific complaints or concerns at this time.  No recent illness or injury.  Compliant with all prescribed medications without adverse side effects. Stopped smoking cigarettes, now vaping, but not interested in quitting at this time.    Review of Systems   Constitutional:  Negative for chills, fatigue and fever.   HENT:  Negative for congestion.    Eyes:  Negative for visual disturbance.   Respiratory:  Negative for cough and shortness of breath.    Cardiovascular:  Negative for chest pain.   Gastrointestinal:  Negative for abdominal pain, nausea and vomiting.   Genitourinary:  Negative for difficulty urinating.   Musculoskeletal:  Negative for arthralgias.   Skin:  Negative for rash.   Allergic/Immunologic: Negative for immunocompromised state.   Neurological:  Negative for dizziness.   Hematological:  Does not bruise/bleed easily.   Psychiatric/Behavioral:  Negative for sleep disturbance.      Objective:      Vitals:    05/26/23 1043   BP: 120/72   BP Location: Right arm   Patient Position: Sitting   BP Method: Medium (Manual)   Pulse: 91   Resp: 18   Temp: 97.8 °F (36.6 °C)   TempSrc: Temporal   SpO2: 97%   Weight: 75.4 kg (166 lb 3.6 oz)   Height: 5' 6" (1.676 m)     Physical Exam  Vitals and nursing note reviewed.   Constitutional:       General: She is not in acute distress.     Appearance: Normal appearance. She is well-developed.   HENT:      Head: Normocephalic and atraumatic.   Cardiovascular:      Rate and Rhythm: Normal rate and regular rhythm.      Heart sounds: Normal heart sounds.   Pulmonary:      Effort: Pulmonary effort is normal.      Breath sounds: Normal breath sounds.   Abdominal:      General: There is no distension.      Tenderness: There is no abdominal tenderness.   Musculoskeletal:      Right lower " leg: No edema.      Left lower leg: No edema.   Skin:     General: Skin is warm and dry.   Neurological:      Mental Status: She is alert and oriented to person, place, and time.   Psychiatric:         Mood and Affect: Mood normal.         Behavior: Behavior normal.       Lab Results   Component Value Date    WBC 7.91 11/23/2021    HGB 14.8 11/23/2021    HCT 44.5 11/23/2021     11/23/2021    CHOL 185 11/23/2021    TRIG 90 11/23/2021    HDL 35 (L) 11/23/2021    ALT 11 11/23/2021    AST 18 11/23/2021     11/23/2021    K 4.1 11/23/2021     11/23/2021    CREATININE 0.7 11/23/2021    BUN 8 11/23/2021    CO2 25 11/23/2021    TSH 1.026 11/23/2021      Assessment:       1. Annual physical exam    2. Vaping nicotine dependence, non-tobacco product    3. Need for vaccination    4. Screening for diabetes mellitus        Plan:       Annual physical exam  -     CBC Auto Differential; Future; Expected date: 05/26/2023  -     Comprehensive Metabolic Panel; Future; Expected date: 05/26/2023  -     Lipid Panel; Future; Expected date: 05/26/2023  -     Hemoglobin A1C; Future; Expected date: 05/26/2023  -     TSH; Future; Expected date: 05/26/2023    Vaping nicotine dependence, non-tobacco product  Not interested in quitting  Need for vaccination  -     Pneumococcal Conjugate Vaccine (20 Valent) (IM)    Screening for diabetes mellitus  -     Hemoglobin A1C; Future; Expected date: 05/26/2023      Medication List with Changes/Refills   Current Medications    LEVONORGESTREL (MIRENA) 20 MCG/24 HOURS (5 YRS) 52 MG IUD    1 Intra Uterine Device by Intrauterine route once. RETURN FOR INTRAUTERINE INSERTION IN THE OFFICE for 1 dose   Discontinued Medications    FLUCONAZOLE (DIFLUCAN) 150 MG TAB    fluconazole 150 mg tablet   TK ONE T PO ONE AND REPEAT DOSE IF SYMPTOMS RECUR    LORAZEPAM (ATIVAN) 0.5 MG TABLET    Take 1 tablet (0.5 mg total) by mouth 2 (two) times daily as needed for Anxiety.    METRONIDAZOLE (VANDAZOLE)  0.75 % VAGINAL GEL    Vandazole 0.75 % vaginal gel   I 1 APL VAGINALLY QD FOR 5 DAYS    NICOTINE (NICODERM CQ) 21 MG/24 HR    nicotine 21 mg/24 hr daily transdermal patch   PLACE 1 PATCH ONTO THE SKIN D

## 2023-09-18 ENCOUNTER — PATIENT MESSAGE (OUTPATIENT)
Dept: PRIMARY CARE CLINIC | Facility: CLINIC | Age: 38
End: 2023-09-18
Payer: MEDICAID

## 2023-09-30 PROBLEM — K04.7 DENTAL INFECTION: Status: ACTIVE | Noted: 2023-09-30

## 2023-09-30 PROBLEM — K08.89 DENTALGIA: Status: ACTIVE | Noted: 2023-09-30

## 2023-10-18 ENCOUNTER — PATIENT MESSAGE (OUTPATIENT)
Dept: CARDIOLOGY | Facility: CLINIC | Age: 38
End: 2023-10-18
Payer: MEDICAID

## 2024-05-27 ENCOUNTER — OFFICE VISIT (OUTPATIENT)
Dept: PRIMARY CARE CLINIC | Facility: CLINIC | Age: 39
End: 2024-05-27
Payer: MEDICAID

## 2024-05-27 ENCOUNTER — TELEPHONE (OUTPATIENT)
Dept: OBSTETRICS AND GYNECOLOGY | Facility: CLINIC | Age: 39
End: 2024-05-27
Payer: MEDICAID

## 2024-05-27 VITALS
WEIGHT: 164.88 LBS | RESPIRATION RATE: 18 BRPM | HEART RATE: 88 BPM | TEMPERATURE: 97 F | DIASTOLIC BLOOD PRESSURE: 80 MMHG | OXYGEN SATURATION: 98 % | BODY MASS INDEX: 25.88 KG/M2 | HEIGHT: 67 IN | SYSTOLIC BLOOD PRESSURE: 114 MMHG

## 2024-05-27 DIAGNOSIS — Z97.5 CONTRACEPTION, DEVICE INTRAUTERINE: Primary | ICD-10-CM

## 2024-05-27 DIAGNOSIS — Z00.00 ANNUAL PHYSICAL EXAM: Primary | ICD-10-CM

## 2024-05-27 DIAGNOSIS — D48.5 NEOPLASM OF UNCERTAIN BEHAVIOR OF SKIN: ICD-10-CM

## 2024-05-27 PROBLEM — K04.7 DENTAL INFECTION: Status: RESOLVED | Noted: 2023-09-30 | Resolved: 2024-05-27

## 2024-05-27 PROBLEM — K08.89 DENTALGIA: Status: RESOLVED | Noted: 2023-09-30 | Resolved: 2024-05-27

## 2024-05-27 PROCEDURE — 1160F RVW MEDS BY RX/DR IN RCRD: CPT | Mod: CPTII,,, | Performed by: FAMILY MEDICINE

## 2024-05-27 PROCEDURE — 99999 PR PBB SHADOW E&M-EST. PATIENT-LVL III: CPT | Mod: PBBFAC,,, | Performed by: FAMILY MEDICINE

## 2024-05-27 PROCEDURE — 3079F DIAST BP 80-89 MM HG: CPT | Mod: CPTII,,, | Performed by: FAMILY MEDICINE

## 2024-05-27 PROCEDURE — 1159F MED LIST DOCD IN RCRD: CPT | Mod: CPTII,,, | Performed by: FAMILY MEDICINE

## 2024-05-27 PROCEDURE — 3008F BODY MASS INDEX DOCD: CPT | Mod: CPTII,,, | Performed by: FAMILY MEDICINE

## 2024-05-27 PROCEDURE — 3074F SYST BP LT 130 MM HG: CPT | Mod: CPTII,,, | Performed by: FAMILY MEDICINE

## 2024-05-27 PROCEDURE — 99213 OFFICE O/P EST LOW 20 MIN: CPT | Mod: PBBFAC,PN | Performed by: FAMILY MEDICINE

## 2024-05-27 PROCEDURE — 99395 PREV VISIT EST AGE 18-39: CPT | Mod: S$PBB,,, | Performed by: FAMILY MEDICINE

## 2024-05-27 NOTE — TELEPHONE ENCOUNTER
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 Device ordered for Insertion. Pt ISSA

## 2024-05-27 NOTE — TELEPHONE ENCOUNTER
----- Message from Kera Schulte LPN sent at 5/16/2024  4:34 PM CDT -----  Needs iud removal  5/27/2024 - 7/31/2024

## 2024-05-27 NOTE — PROGRESS NOTES
"Subjective:       Patient ID: Blanca Flores is a 38 y.o. female.    Chief Complaint: Annual Exam    Blanca Flores is a 38 y.o. female seen today for a routine checkup. The patient has no specific complaints or concerns at this time.  No recent illness or injury.       Review of Systems   Constitutional:  Negative for activity change and unexpected weight change.   HENT:  Negative for hearing loss, rhinorrhea and trouble swallowing.    Eyes:  Negative for discharge and visual disturbance.   Respiratory:  Negative for chest tightness and wheezing.    Cardiovascular:  Negative for chest pain and palpitations.   Gastrointestinal:  Negative for blood in stool, constipation, diarrhea and vomiting.   Endocrine: Negative for polydipsia and polyuria.   Genitourinary:  Negative for difficulty urinating, dysuria, hematuria and menstrual problem.   Musculoskeletal:  Negative for arthralgias, joint swelling and neck pain.   Allergic/Immunologic: Negative for immunocompromised state.   Neurological:  Negative for weakness and headaches.   Hematological:  Does not bruise/bleed easily.   Psychiatric/Behavioral:  Negative for confusion and dysphoric mood.        Objective:      Vitals:    05/27/24 1030   BP: 114/80   BP Location: Right arm   Patient Position: Sitting   BP Method: Medium (Manual)   Pulse: 88   Resp: 18   Temp: 97.2 °F (36.2 °C)   SpO2: 98%   Weight: 74.8 kg (164 lb 14.5 oz)   Height: 5' 7" (1.702 m)     BP Readings from Last 5 Encounters:   05/27/24 114/80   09/30/23 (!) 142/80   06/26/23 126/88   05/26/23 120/72   01/04/22 119/78     Wt Readings from Last 5 Encounters:   05/27/24 74.8 kg (164 lb 14.5 oz)   09/30/23 78 kg (171 lb 15.3 oz)   06/26/23 74.8 kg (165 lb)   05/26/23 75.4 kg (166 lb 3.6 oz)   01/04/22 79.9 kg (176 lb 4.1 oz)     Physical Exam  Vitals and nursing note reviewed.   Constitutional:       General: She is not in acute distress.     Appearance: Normal appearance. She is well-developed. "   HENT:      Head: Normocephalic and atraumatic.   Cardiovascular:      Rate and Rhythm: Normal rate and regular rhythm.      Heart sounds: Normal heart sounds.   Pulmonary:      Effort: Pulmonary effort is normal.      Breath sounds: Normal breath sounds.   Abdominal:      General: Bowel sounds are normal. There is no distension.      Tenderness: There is no abdominal tenderness.   Musculoskeletal:      Right lower leg: No edema.      Left lower leg: No edema.   Skin:     General: Skin is warm and dry.   Neurological:      Mental Status: She is alert and oriented to person, place, and time.   Psychiatric:         Mood and Affect: Mood normal.         Behavior: Behavior normal.         Lab Results   Component Value Date    WBC 6.68 05/26/2023    HGB 14.8 05/26/2023    HCT 45.5 05/26/2023     05/26/2023    CHOL 163 05/26/2023    TRIG 87 05/26/2023    HDL 37 (L) 05/26/2023    ALT 10 05/26/2023    AST 12 05/26/2023     05/26/2023    K 4.3 05/26/2023     05/26/2023    CREATININE 0.7 05/26/2023    BUN 10 05/26/2023    CO2 27 05/26/2023    TSH 0.366 (L) 05/26/2023    HGBA1C 5.1 05/26/2023      Assessment:       1. Annual physical exam    2. Neoplasm of uncertain behavior of skin        Plan:       Annual physical exam  -     CBC Auto Differential; Future; Expected date: 05/27/2024  -     Comprehensive Metabolic Panel; Future; Expected date: 05/27/2024  -     Lipid Panel; Future; Expected date: 05/27/2024  -     TSH; Future; Expected date: 05/27/2024    Neoplasm of uncertain behavior of skin  -     Ambulatory referral/consult to Dermatology; Future; Expected date: 06/03/2024      Medication List with Changes/Refills   Current Medications    LEVONORGESTREL (MIRENA) 20 MCG/24 HOURS (5 YRS) 52 MG IUD    1 Intra Uterine Device by Intrauterine route once. RETURN FOR INTRAUTERINE INSERTION IN THE OFFICE for 1 dose   Discontinued Medications    AMOXICILLIN (AMOXIL) 875 MG TABLET    Take 1 tablet (875 mg total)  by mouth 2 (two) times daily.    IBUPROFEN (ADVIL,MOTRIN) 800 MG TABLET    Take 1 tablet (800 mg total) by mouth every 6 (six) hours as needed for Pain.

## 2024-06-25 ENCOUNTER — PROCEDURE VISIT (OUTPATIENT)
Dept: OBSTETRICS AND GYNECOLOGY | Facility: CLINIC | Age: 39
End: 2024-06-25
Payer: MEDICAID

## 2024-06-25 VITALS
BODY MASS INDEX: 26.85 KG/M2 | DIASTOLIC BLOOD PRESSURE: 82 MMHG | HEIGHT: 67 IN | SYSTOLIC BLOOD PRESSURE: 125 MMHG | WEIGHT: 171.06 LBS

## 2024-06-25 DIAGNOSIS — Z11.3 SCREENING FOR STDS (SEXUALLY TRANSMITTED DISEASES): ICD-10-CM

## 2024-06-25 DIAGNOSIS — Z30.433 ENCOUNTER FOR IUD REMOVAL AND REINSERTION: Primary | ICD-10-CM

## 2024-06-25 LAB
B-HCG UR QL: NEGATIVE
CTP QC/QA: YES

## 2024-06-25 PROCEDURE — 99999PBSHW POCT URINE PREGNANCY: Mod: PBBFAC,,,

## 2024-06-25 PROCEDURE — 99999PBSHW PR PBB SHADOW TECHNICAL ONLY FILED TO HB: Mod: PBBFAC,,,

## 2024-06-25 PROCEDURE — 58300 INSERT INTRAUTERINE DEVICE: CPT | Mod: PBBFAC,PN | Performed by: OBSTETRICS & GYNECOLOGY

## 2024-06-25 PROCEDURE — 87491 CHLMYD TRACH DNA AMP PROBE: CPT | Performed by: OBSTETRICS & GYNECOLOGY

## 2024-06-25 PROCEDURE — 58301 REMOVE INTRAUTERINE DEVICE: CPT | Mod: S$PBB,,, | Performed by: OBSTETRICS & GYNECOLOGY

## 2024-06-25 PROCEDURE — 81025 URINE PREGNANCY TEST: CPT | Mod: PBBFAC,PN | Performed by: OBSTETRICS & GYNECOLOGY

## 2024-06-25 PROCEDURE — 87591 N.GONORRHOEAE DNA AMP PROB: CPT | Performed by: OBSTETRICS & GYNECOLOGY

## 2024-06-25 RX ADMIN — LEVONORGESTREL 1 INTRA UTERINE DEVICE: 52 INTRAUTERINE DEVICE INTRAUTERINE at 02:06

## 2024-06-25 NOTE — PROCEDURES
Removal and Insertion of Intrauterine Device    Date/Time: 6/25/2024 2:30 PM    Performed by: Nena Peterson MD  Authorized by: Nena Peterson MD    Consent:     Consent obtained:  Prior to procedure the appropriate consent was completed and verified    Consent given by:  Patient    Procedure risks and benefits discussed: yes      Patient questions answered: yes      Patient agrees, verbalizes understanding, and wants to proceed: yes     Device to be inserted was verified by patient: yes  Removal Procedure:    IUD grasped by: forceps   Removed with no complications: IUD removal not due to complications   Removal due to mechanical complications: yes (embedded in myometrium - painful intercourse)    Insertion Procedure:   1 Intra Uterine Device levonorgestreL 21 mcg/24 hr (8 yrs) 52 mg       Negative urine pregnancy test: yes      Cervix cleaned and prepped: yes      Speculum placed in vagina: yes      Tenaculum applied to cervix: yes      Uterus sounded: yes      Uterus sound depth (cm):  8    IUD inserted with no complications: yes      IUD type:  Mirena    Strings trimmed: yes    Post-procedure:     Patient tolerated procedure well: yes      Patient will follow up after next period: yes    Comments:      RTC 6 wks for IUD check/annual

## 2024-06-26 LAB
C TRACH DNA SPEC QL NAA+PROBE: NOT DETECTED
N GONORRHOEA DNA SPEC QL NAA+PROBE: NOT DETECTED

## 2024-08-13 ENCOUNTER — OFFICE VISIT (OUTPATIENT)
Dept: OBSTETRICS AND GYNECOLOGY | Facility: CLINIC | Age: 39
End: 2024-08-13
Payer: MEDICAID

## 2024-08-13 VITALS
HEART RATE: 90 BPM | SYSTOLIC BLOOD PRESSURE: 110 MMHG | HEIGHT: 67 IN | WEIGHT: 174.63 LBS | BODY MASS INDEX: 27.41 KG/M2 | DIASTOLIC BLOOD PRESSURE: 68 MMHG

## 2024-08-13 DIAGNOSIS — Z01.419 WELL WOMAN EXAM WITH ROUTINE GYNECOLOGICAL EXAM: Primary | ICD-10-CM

## 2024-08-13 DIAGNOSIS — N95.1 PERIMENOPAUSAL SYMPTOMS: ICD-10-CM

## 2024-08-13 DIAGNOSIS — K59.09 CHRONIC CONSTIPATION: ICD-10-CM

## 2024-08-13 DIAGNOSIS — Z12.4 SCREENING FOR CERVICAL CANCER: ICD-10-CM

## 2024-08-13 PROCEDURE — 3078F DIAST BP <80 MM HG: CPT | Mod: CPTII,,, | Performed by: OBSTETRICS & GYNECOLOGY

## 2024-08-13 PROCEDURE — 87624 HPV HI-RISK TYP POOLED RSLT: CPT | Performed by: OBSTETRICS & GYNECOLOGY

## 2024-08-13 PROCEDURE — 88175 CYTOPATH C/V AUTO FLUID REDO: CPT | Performed by: OBSTETRICS & GYNECOLOGY

## 2024-08-13 PROCEDURE — 99395 PREV VISIT EST AGE 18-39: CPT | Mod: S$PBB,,, | Performed by: OBSTETRICS & GYNECOLOGY

## 2024-08-13 PROCEDURE — 1159F MED LIST DOCD IN RCRD: CPT | Mod: CPTII,,, | Performed by: OBSTETRICS & GYNECOLOGY

## 2024-08-13 PROCEDURE — 3074F SYST BP LT 130 MM HG: CPT | Mod: CPTII,,, | Performed by: OBSTETRICS & GYNECOLOGY

## 2024-08-13 PROCEDURE — 3008F BODY MASS INDEX DOCD: CPT | Mod: CPTII,,, | Performed by: OBSTETRICS & GYNECOLOGY

## 2024-08-13 PROCEDURE — 99213 OFFICE O/P EST LOW 20 MIN: CPT | Mod: PBBFAC,PN | Performed by: OBSTETRICS & GYNECOLOGY

## 2024-08-13 PROCEDURE — 99999 PR PBB SHADOW E&M-EST. PATIENT-LVL III: CPT | Mod: PBBFAC,,, | Performed by: OBSTETRICS & GYNECOLOGY

## 2024-08-13 NOTE — PROGRESS NOTES
"Subjective     Patient ID: Blanca Flores is a 39 y.o. female.    Chief Complaint:  IUD Check      History of Present Illness  HPI  38yo  presents for annual exam and IUD follow up.  Mirena IUD inserted 24 (previous IUD embedded in myometrium).  Doing well with this one, amenorrheic now, no pain.  Desires to check hormones.  No other complaints today.  Last pap  wnl.  On ROS, she does note chronic constipation, no blood in stool, has not seen GI.      GYN & OB History  No LMP recorded. Patient has had an implant.   Date of Last Pap: 2024    OB History    Para Term  AB Living   2 2 2     2   SAB IAB Ectopic Multiple Live Births           2      # Outcome Date GA Lbr Samuel/2nd Weight Sex Type Anes PTL Lv   2 Term      Vag-Spont   CAYETANO   1 Term      Vag-Spont   CAYETANO       Review of Systems  Review of Systems   Constitutional:  Negative for chills and fever.   Respiratory:  Negative for cough and shortness of breath.    Cardiovascular:  Negative for chest pain.   Gastrointestinal:  Positive for constipation. Negative for abdominal pain and diarrhea.   Genitourinary:  Negative for pelvic pain, vaginal bleeding and vaginal discharge.   Musculoskeletal:  Negative for myalgias.   Neurological:  Negative for headaches.          Objective   Physical Exam    /68 (BP Location: Right arm, Patient Position: Sitting, BP Method: Medium (Automatic))   Pulse 90   Ht 5' 7" (1.702 m)   Wt 79.2 kg (174 lb 9.7 oz)   BMI 27.35 kg/m²     Gen: NAD  Resp: Normal respiratory effort  Breast: Symmetric, nontender.  No masses.  No skin changes.  No nipple discharge.   Abd: soft, NT  Pelvic: Normal-appearing external female genitalia.  IUD strings visible at os.  Uterus small, mobile, nontender.  No adnexal masses or tenderness.    Ext: normal ROM  Psych: appropriate affect  Neuro: grossly intact         Assessment and Plan   Blanca Guadalupe" was seen today for iud check.    Diagnoses and all orders " for this visit:    Well woman exam with routine gynecological exam    Screening for cervical cancer  -     Liquid-Based Pap Smear, Screening  -     HPV High Risk Genotypes, PCR    Perimenopausal symptoms  -     TESTOSTERONE; Future  -     ESTRADIOL; Future  -     FOLLICLE STIMULATING HORMONE; Future    Chronic constipation  -     Ambulatory referral/consult to Gastroenterology; Future          Plan:  Routine annual exam with pap smear and breast exam today.  Declined STD screening (recent gc/ct at time of IUD negative).  Hormone labs pending.   Doing well on Mirena now, will continue to monitor.  Due for removal 2032.  Counseling done, precautions given, all questions answered.  RTC 1 year for annual, or prn.

## 2024-08-16 LAB
FINAL PATHOLOGIC DIAGNOSIS: NORMAL
HPV HR 12 DNA SPEC QL NAA+PROBE: NEGATIVE
HPV16 AG SPEC QL: NEGATIVE
HPV18 DNA SPEC QL NAA+PROBE: NEGATIVE
Lab: NORMAL

## 2024-09-19 ENCOUNTER — PATIENT MESSAGE (OUTPATIENT)
Dept: PRIMARY CARE CLINIC | Facility: CLINIC | Age: 39
End: 2024-09-19
Payer: MEDICAID

## 2024-11-01 ENCOUNTER — OFFICE VISIT (OUTPATIENT)
Dept: GASTROENTEROLOGY | Facility: CLINIC | Age: 39
End: 2024-11-01
Payer: MEDICAID

## 2024-11-01 VITALS
DIASTOLIC BLOOD PRESSURE: 77 MMHG | WEIGHT: 164.44 LBS | BODY MASS INDEX: 25.76 KG/M2 | HEART RATE: 86 BPM | SYSTOLIC BLOOD PRESSURE: 114 MMHG | RESPIRATION RATE: 18 BRPM | OXYGEN SATURATION: 99 %

## 2024-11-01 DIAGNOSIS — K59.09 CHRONIC CONSTIPATION: ICD-10-CM

## 2024-11-01 PROCEDURE — 99999 PR PBB SHADOW E&M-EST. PATIENT-LVL III: CPT | Mod: PBBFAC,,, | Performed by: NURSE PRACTITIONER

## 2024-11-01 PROCEDURE — 99213 OFFICE O/P EST LOW 20 MIN: CPT | Mod: PBBFAC,PN | Performed by: NURSE PRACTITIONER

## 2025-02-18 ENCOUNTER — PATIENT MESSAGE (OUTPATIENT)
Dept: GASTROENTEROLOGY | Facility: CLINIC | Age: 40
End: 2025-02-18
Payer: MEDICAID

## 2025-05-14 ENCOUNTER — PATIENT MESSAGE (OUTPATIENT)
Dept: PRIMARY CARE CLINIC | Facility: CLINIC | Age: 40
End: 2025-05-14
Payer: MEDICAID

## 2025-06-30 ENCOUNTER — OFFICE VISIT (OUTPATIENT)
Dept: PRIMARY CARE CLINIC | Facility: CLINIC | Age: 40
End: 2025-06-30
Payer: MEDICAID

## 2025-06-30 VITALS
BODY MASS INDEX: 26.1 KG/M2 | WEIGHT: 166.31 LBS | HEART RATE: 90 BPM | DIASTOLIC BLOOD PRESSURE: 66 MMHG | OXYGEN SATURATION: 99 % | HEIGHT: 67 IN | RESPIRATION RATE: 18 BRPM | SYSTOLIC BLOOD PRESSURE: 108 MMHG | TEMPERATURE: 98 F

## 2025-06-30 DIAGNOSIS — K59.09 CHRONIC CONSTIPATION: ICD-10-CM

## 2025-06-30 DIAGNOSIS — R73.9 HYPERGLYCEMIA: ICD-10-CM

## 2025-06-30 DIAGNOSIS — Z00.00 ANNUAL PHYSICAL EXAM: Primary | ICD-10-CM

## 2025-06-30 DIAGNOSIS — Z13.1 SCREENING FOR DIABETES MELLITUS: ICD-10-CM

## 2025-06-30 PROCEDURE — 99213 OFFICE O/P EST LOW 20 MIN: CPT | Mod: PBBFAC,PN | Performed by: FAMILY MEDICINE

## 2025-06-30 PROCEDURE — 99999 PR PBB SHADOW E&M-EST. PATIENT-LVL III: CPT | Mod: PBBFAC,,, | Performed by: FAMILY MEDICINE

## 2025-06-30 PROCEDURE — 1160F RVW MEDS BY RX/DR IN RCRD: CPT | Mod: CPTII,,, | Performed by: FAMILY MEDICINE

## 2025-06-30 PROCEDURE — 3078F DIAST BP <80 MM HG: CPT | Mod: CPTII,,, | Performed by: FAMILY MEDICINE

## 2025-06-30 PROCEDURE — 3008F BODY MASS INDEX DOCD: CPT | Mod: CPTII,,, | Performed by: FAMILY MEDICINE

## 2025-06-30 PROCEDURE — 1159F MED LIST DOCD IN RCRD: CPT | Mod: CPTII,,, | Performed by: FAMILY MEDICINE

## 2025-06-30 PROCEDURE — 99395 PREV VISIT EST AGE 18-39: CPT | Mod: S$PBB,,, | Performed by: FAMILY MEDICINE

## 2025-06-30 PROCEDURE — 3074F SYST BP LT 130 MM HG: CPT | Mod: CPTII,,, | Performed by: FAMILY MEDICINE

## 2025-06-30 NOTE — PROGRESS NOTES
Assessment:       1. Annual physical exam    2. Screening for diabetes mellitus    3. Hyperglycemia    4. Chronic constipation         Plan:       Annual physical exam  -     CBC Auto Differential; Future; Expected date: 06/30/2025  -     Comprehensive Metabolic Panel; Future; Expected date: 06/30/2025  -     Lipid Panel; Future; Expected date: 06/30/2025  -     Hemoglobin A1C; Future; Expected date: 06/30/2025  -     TSH; Future; Expected date: 06/30/2025    Screening for diabetes mellitus  -     Hemoglobin A1C; Future; Expected date: 06/30/2025    Hyperglycemia  -     Hemoglobin A1C; Future; Expected date: 06/30/2025    Chronic constipation      Assessment & Plan    Z00.00 Annual physical exam  Z13.1 Screening for diabetes mellitus  R73.9 Hyperglycemia  K59.09 Chronic constipation    - Cardiovascular health assessed; heart and lungs sound fine, BP looks good.  - Immunization status reviewed; DTaP from 2016 is still valid (good for 10 years).    ANNUAL PHYSICAL EXAM:   Ordered thyroid function test to rule out potential thyroid issues.    SCREENING FOR DIABETES MELLITUS:   Ordered full panel and HbA1c to assess glucose levels, considering family history of diabetes and previous high glucose reading.    HYPERGLYCEMIA:   Ordered full panel and HbA1c to assess glucose levels, considering family history of diabetes and previous high glucose reading.    CHRONIC CONSTIPATION:   Patient to continue drinking plenty of fluids and getting adequate fiber intake.   Continue Miralax as needed for constipation.       Medication List with Changes/Refills   Current Medications    LEVONORGESTREL (MIRENA) 20 MCG/24 HOURS (5 YRS) 52 MG IUD    1 Intra Uterine Device by Intrauterine route once. RETURN FOR INTRAUTERINE INSERTION IN THE OFFICE for 1 dose         Subjective:    Patient ID: Blanca Flores is a 40 y.o. female.  Chief Complaint: Annual Exam    HPI  History of Present Illness    CHIEF COMPLAINT:  Patient presents today for  "routine check-up    BLOOD SUGAR CONCERNS:  She reports a high glucose reading detected by school nurse in May, which prompted immediate medical follow-up. The reading was taken when she had not eaten all day and only consumed water. Both parents have diabetes.    GASTROINTESTINAL:  She experiences near-syncope episodes immediately after eating. She has severe, chronic constipation and is under gastroenterologist care. She takes Miralax daily without improvement. She denies vomiting or diarrhea.    CURRENT MEDICATIONS:  She takes Miralax as needed for constipation management, but denies any other regular medications.      ROS:  Constitutional: -fevers, -chills, -weight loss, -weight gain, -night sweats  Respiratory: -difficulty breathing  Cardiovascular: -chest pain  Gastrointestinal: -vomiting, -diarrhea, +constipation, +bloating  Genitourinary: +frequency, -urinary incontinence  Neurological: +near-syncope       Review of Systems    Objective:      Vitals:    06/30/25 1549   BP: 108/66   BP Location: Left arm   Patient Position: Sitting   Pulse: 90   Resp: 18   Temp: 97.9 °F (36.6 °C)   TempSrc: Oral   SpO2: 99%   Weight: 75.4 kg (166 lb 5.4 oz)   Height: 5' 7" (1.702 m)     BP Readings from Last 5 Encounters:   06/30/25 108/66   11/01/24 114/77   08/13/24 110/68   06/25/24 125/82   05/27/24 114/80     Wt Readings from Last 5 Encounters:   06/30/25 75.4 kg (166 lb 5.4 oz)   11/01/24 74.6 kg (164 lb 7.4 oz)   08/13/24 79.2 kg (174 lb 9.7 oz)   06/25/24 77.6 kg (171 lb 1.2 oz)   05/27/24 74.8 kg (164 lb 14.5 oz)     Physical Exam  Physical Exam    General: Well-developed. Well-nourished. No acute distress.  Eyes: EOMI. Sclerae anicteric.  HENT: Normocephalic. Atraumatic. Nares patent. Moist oral mucosa.  Cardiovascular: Regular rate. Regular rhythm. No murmurs. No rubs. No gallops. Normal S1, S2.  Respiratory: Normal respiratory effort. Clear to auscultation bilaterally. No rales. No rhonchi. No " wheezing.  Musculoskeletal: No  obvious deformity.  Extremities: No lower extremity edema.  Neurological: Alert & oriented x3. No slurred speech. Normal gait.  Psychiatric: Normal mood. Normal affect. Good insight. Good judgment.  Skin: Warm. Dry. No rash.         Lab Results   Component Value Date    WBC 6.60 05/27/2024    HGB 14.8 05/27/2024    HCT 43.7 05/27/2024     05/27/2024    CHOL 156 05/27/2024    TRIG 76 05/27/2024    HDL 34 (L) 05/27/2024    ALT 11 05/27/2024    AST 13 05/27/2024     05/27/2024    K 4.0 05/27/2024     05/27/2024    CREATININE 0.7 05/27/2024    BUN 8 05/27/2024    CO2 22 (L) 05/27/2024    TSH 0.670 05/27/2024    HGBA1C 5.1 05/26/2023      This note was generated with the assistance of ambient listening technology. Verbal consent was obtained by the patient and accompanying visitor(s) for the recording of patient appointment to facilitate this note. I attest to having reviewed and edited the generated note for accuracy, though some syntax or spelling errors may persist. Please contact the author of this note for any clarification.

## 2025-07-01 ENCOUNTER — RESULTS FOLLOW-UP (OUTPATIENT)
Dept: PRIMARY CARE CLINIC | Facility: CLINIC | Age: 40
End: 2025-07-01